# Patient Record
Sex: MALE | Race: WHITE | NOT HISPANIC OR LATINO | Employment: FULL TIME | URBAN - NONMETROPOLITAN AREA
[De-identification: names, ages, dates, MRNs, and addresses within clinical notes are randomized per-mention and may not be internally consistent; named-entity substitution may affect disease eponyms.]

---

## 2017-03-09 ENCOUNTER — OFFICE VISIT (OUTPATIENT)
Dept: CARDIOLOGY | Facility: CLINIC | Age: 67
End: 2017-03-09

## 2017-03-09 VITALS
SYSTOLIC BLOOD PRESSURE: 119 MMHG | HEIGHT: 67 IN | HEART RATE: 66 BPM | BODY MASS INDEX: 24.8 KG/M2 | DIASTOLIC BLOOD PRESSURE: 76 MMHG | RESPIRATION RATE: 16 BRPM | WEIGHT: 158 LBS

## 2017-03-09 DIAGNOSIS — I25.10 ARTERIOSCLEROTIC CARDIOVASCULAR DISEASE (ASCVD): Primary | ICD-10-CM

## 2017-03-09 DIAGNOSIS — E78.5 DYSLIPIDEMIA: ICD-10-CM

## 2017-03-09 DIAGNOSIS — I10 ESSENTIAL HYPERTENSION: ICD-10-CM

## 2017-03-09 DIAGNOSIS — I20.9 ANGINA, CLASS II (HCC): ICD-10-CM

## 2017-03-09 PROCEDURE — 93000 ELECTROCARDIOGRAM COMPLETE: CPT | Performed by: INTERNAL MEDICINE

## 2017-03-09 PROCEDURE — 99213 OFFICE O/P EST LOW 20 MIN: CPT | Performed by: INTERNAL MEDICINE

## 2017-03-09 RX ORDER — LANOLIN ALCOHOL/MO/W.PET/CERES
1000 CREAM (GRAM) TOPICAL DAILY
COMMUNITY
End: 2021-07-02

## 2017-03-09 NOTE — PROGRESS NOTES
"Luci Wilson MD  Duy Schwarz  1950 11/07/2016    Patient Active Problem List   Diagnosis   • Angina, class II-III   • Diabetes   • Arteriosclerotic cardiovascular disease (ASCVD), s/p CABG (3V) in 1991  with recent unsuccessful attempt at PCI of severe calcific stenosis of the proximal left circumflex coronary artery.   • Dyslipidemia, pt's PMD monitoring.   • Essential hypertension   • Abnormal stress ECG   • Abnormal stress test       Dear Luci Wilson MD:    Subjective     Duy Schwarz is a 66 y.o. male with the problems as listed above, presents for follow up of ASCVD.    History of Present Illness:  is a 66-year-old  male with history of known ASCVD, status post CABG in 1991 with recent cardiac catheterization by Dr. Wright revealing significant stenosis in the proximal left circumflex coronary artery with a calcified plaque that could not be stented and patient left on medical therapy.  Patient was seen recently for some recurrent chest pains at which time Ranexa 500 mg by mouth twice a day was added.  Patient states his chest pains are much better since he started on Ranexa.  He is requesting to return to being a \"back up\"  at his job position as a  with his company.  He denies any significant dyspnea, PND orthopnea or pedal edema.    Today patient states that he is feeling better overall.  Only experiences occasional, very mild chest pain.  No SOA, dizziness or syncope reported.      Allergies   Allergen Reactions   • Valium [Diazepam]    :      Current Outpatient Prescriptions:   •  aspirin  MG EC tablet, Take 325 mg by mouth daily., Disp: , Rfl:   •  atenolol (TENORMIN) 50 MG tablet, 1.5 tablets daily. (Daily dose of 75 mg). (Patient taking differently: 75 mg. 1.5 tablets daily. (Daily dose of 75 mg).), Disp: 45 tablet, Rfl: 11  •  atorvastatin (LIPITOR) 40 MG tablet, Take 40 mg by mouth daily., Disp: , Rfl:   •  Cholecalciferol 2000 UNITS capsule, " Take 2,000 Units by mouth Daily., Disp: , Rfl:   •  Cinnamon 500 MG tablet, Take 1 tablet by mouth 2 (two) times a day., Disp: , Rfl:   •  clopidogrel (PLAVIX) 75 MG tablet, Take 1 tablet by mouth daily., Disp: 30 tablet, Rfl: 3  •  coenzyme Q10 100 MG capsule, Take 100 mg by mouth daily., Disp: , Rfl:   •  isosorbide mononitrate (IMDUR) 60 MG 24 hr tablet, Take 1 tablet by mouth daily., Disp: 30 tablet, Rfl: 3  •  lisinopril (PRINIVIL,ZESTRIL) 5 MG tablet, Take 1 tablet by mouth daily., Disp: 30 tablet, Rfl: 11  •  MEGARED OMEGA-3 KRILL  MG capsule, Take 1 capsule by mouth 2 (two) times a day., Disp: , Rfl:   •  nitroglycerin (NITROSTAT) 0.4 MG SL tablet, 1 under the tongue as needed for angina, may repeat q5mins for up three doses, Disp: 25 tablet, Rfl: 2  •  omeprazole (PriLOSEC) 20 MG capsule, Take 20 mg by mouth daily., Disp: , Rfl:   •  ranolazine (RANEXA) 1000 MG 12 hr tablet, Take 1 tablet by mouth 2 (Two) Times a Day., Disp: 60 tablet, Rfl: 5  •  sitaGLIPtin-metFORMIN (JANUMET)  MG per tablet, Take 1 tablet by mouth 2 (two) times a day with meals., Disp: , Rfl:   •  vitamin B-12 (CYANOCOBALAMIN) 1000 MCG tablet, Take 1,000 mcg by mouth Daily., Disp: , Rfl:       The following portions of the patient's history were reviewed and updated as appropriate: allergies, current medications, past family history, past medical history, past social history, past surgical history and problem list.    Social History   Substance Use Topics   • Smoking status: Never Smoker   • Smokeless tobacco: Never Used   • Alcohol use No       Review of Systems   Constitution: Negative for chills.   HENT: Negative for nosebleeds and sore throat.    Respiratory: Negative for wheezing.    Gastrointestinal: Negative for hematemesis, hematochezia and melena.   Genitourinary: Negative for dysuria and hematuria.       Objective   Vitals:    03/09/17 1045   BP: 119/76   Pulse: 66   Resp: 16   Weight: 158 lb (71.7 kg)   Height:  "67\" (170.2 cm)     Body mass index is 24.75 kg/(m^2).        Physical Exam   Constitutional: He is oriented to person, place, and time. He appears well-developed and well-nourished.   HENT:   Mouth/Throat: Oropharynx is clear and moist.   Eyes: EOM are normal. Pupils are equal, round, and reactive to light.   Neck: Neck supple. No JVD present. No tracheal deviation present. No thyromegaly present.   Cardiovascular: Normal rate, regular rhythm, S1 normal and S2 normal.  Exam reveals no gallop and no friction rub.    No murmur heard.  Pulmonary/Chest: Effort normal and breath sounds normal.   Abdominal: Soft. Bowel sounds are normal. He exhibits no mass. There is no tenderness.   Musculoskeletal: Normal range of motion. He exhibits no edema.   Lymphadenopathy:     He has no cervical adenopathy.   Neurological: He is alert and oriented to person, place, and time.   Skin: Skin is warm and dry. No rash noted.   Psychiatric: He has a normal mood and affect.       Lab Results   Component Value Date     08/30/2016    K 3.8 08/30/2016     08/30/2016    CO2 34.0 (H) 08/30/2016    BUN 11 08/30/2016    CREATININE 0.70 08/30/2016    GLUCOSE 91 08/30/2016    CALCIUM 8.8 08/30/2016       Lab Results   Component Value Date    WBC 9.14 08/30/2016    HGB 13.3 08/30/2016    HCT 37.0 (L) 08/30/2016     (L) 08/30/2016       Lab Results   Component Value Date    TRIG 111 08/29/2016    HDL 31 (L) 08/29/2016          ECG 12 Lead  Date/Time: 3/9/2017 10:55 AM  Performed by: CRISTINO TOWNSEND  Authorized by: CRISTINO TOWNSEND   Rhythm: sinus rhythm  BPM: 67  Comments: QTc 387            Assessment/Plan :  Diagnoses and all orders for this visit:    Angina, class II-III, clinically better and almost resolved.  Arteriosclerotic cardiovascular disease (ASCVD), s/p CABG (3V) in 1991 with recent unsuccessful attempt at PCI of severe calcific stenosis of the proximal left circumflex coronary artery.  Essential hypertension, " controlled.  Dyslipidemia, pt's PMD monitoring.     Recommendations:    Continue current medications.  If chest pains reoccur, call office.  Return in about 6 months (around 9/9/2017).    As always, I appreciate very much the opportunity to participate in the cardiovascular care of your patients.      With Best Regards,    Sulaiman Thakkar MD, FACC

## 2017-04-14 RX ORDER — RANOLAZINE 1000 MG/1
1000 TABLET, EXTENDED RELEASE ORAL 2 TIMES DAILY
Qty: 180 TABLET | Refills: 5 | Status: SHIPPED | OUTPATIENT
Start: 2017-04-14 | End: 2018-05-20 | Stop reason: SDUPTHER

## 2017-04-14 NOTE — TELEPHONE ENCOUNTER
Pt ws here on 3.9.17 has a follow up on 9.11.17.    ----- Message from Soo Davila sent at 4/14/2017 10:42 AM EDT -----  Regarding: MEDICATION REFILL  Contact: [phone]  PT HAS TO SWITCH TO [facility]. SO THEY NEED THIS SENT IN TO [facility].     RANEXA ER 1000 MG TABLETS

## 2017-09-11 ENCOUNTER — OFFICE VISIT (OUTPATIENT)
Dept: CARDIOLOGY | Facility: CLINIC | Age: 67
End: 2017-09-11

## 2017-09-11 VITALS
WEIGHT: 157.8 LBS | DIASTOLIC BLOOD PRESSURE: 72 MMHG | SYSTOLIC BLOOD PRESSURE: 117 MMHG | HEIGHT: 67 IN | BODY MASS INDEX: 24.77 KG/M2 | HEART RATE: 74 BPM

## 2017-09-11 DIAGNOSIS — I10 ESSENTIAL HYPERTENSION: ICD-10-CM

## 2017-09-11 DIAGNOSIS — I25.10 ARTERIOSCLEROTIC CARDIOVASCULAR DISEASE (ASCVD): Primary | ICD-10-CM

## 2017-09-11 DIAGNOSIS — E78.5 DYSLIPIDEMIA: ICD-10-CM

## 2017-09-11 PROCEDURE — 99213 OFFICE O/P EST LOW 20 MIN: CPT | Performed by: INTERNAL MEDICINE

## 2017-09-11 PROCEDURE — 93000 ELECTROCARDIOGRAM COMPLETE: CPT | Performed by: INTERNAL MEDICINE

## 2017-09-11 RX ORDER — LISINOPRIL 10 MG/1
10 TABLET ORAL DAILY
COMMUNITY
End: 2021-03-01 | Stop reason: SDUPTHER

## 2017-09-11 NOTE — PROGRESS NOTES
Luci Wilson MD  Duy Schwarz  1950 09/11/2017    Patient Active Problem List   Diagnosis   • Angina, class II-III   • Diabetes   • Arteriosclerotic cardiovascular disease (ASCVD), s/p CABG (3V) in 1991  with recent unsuccessful attempt at PCI of severe calcific stenosis of the proximal left circumflex coronary artery.   • Dyslipidemia, pt's PMD monitoring.   • Essential hypertension   • Abnormal stress ECG   • Abnormal stress test       Dear Luci Wilson MD:    Subjective     Duy Schwarz is a 67 y.o. male with the problems as listed above, presents      History of Present Illness:Mr. Webster is a pleasant 67-year-old  male with history of known ASCVD status post CABG in 1991 with cardiac catheterization with Dr. Wright last year revealing significant stenosis in the proximal left circumflex coronary artery with a calcified plaque that could not be stented and hence was left on medical therapy.  Patient has been doing fairly well on medical therapy including Ranexa thousand milligrams by mouth twice a day.  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  The pattern of chest pain and has been stable over the last 6 months to a year.  He denies any significant dyspnea, PND, orthopnea or pedal edema.  He is a  nonsmoker.    Allergies   Allergen Reactions   • Valium [Diazepam]    :      Current Outpatient Prescriptions:   •  aspirin  MG EC tablet, Take 325 mg by mouth daily., Disp: , Rfl:   •  atenolol (TENORMIN) 50 MG tablet, 1.5 tablets daily. (Daily dose of 75 mg). (Patient taking differently: 75 mg. 1.5 tablets daily. (Daily dose of 75 mg).), Disp: 45 tablet, Rfl: 11  •  atorvastatin (LIPITOR) 40 MG tablet, Take 40 mg by mouth daily., Disp: , Rfl:   •  Cholecalciferol 2000 UNITS capsule, Take 2,000 Units by mouth Daily., Disp: , Rfl:   •  Cinnamon  500 MG tablet, Take 1 tablet by mouth Daily., Disp: , Rfl:   •  clopidogrel (PLAVIX) 75 MG tablet, Take 1 tablet by mouth daily., Disp: 30 tablet, Rfl: 3  •  coenzyme Q10 100 MG capsule, Take 100 mg by mouth daily., Disp: , Rfl:   •  isosorbide mononitrate (IMDUR) 60 MG 24 hr tablet, Take 1 tablet by mouth daily., Disp: 30 tablet, Rfl: 3  •  lisinopril (PRINIVIL,ZESTRIL) 10 MG tablet, Take 10 mg by mouth Daily., Disp: , Rfl:   •  MEGARED OMEGA-3 KRILL  MG capsule, Take 1 capsule by mouth 2 (two) times a day., Disp: , Rfl:   •  nitroglycerin (NITROSTAT) 0.4 MG SL tablet, 1 under the tongue as needed for angina, may repeat q5mins for up three doses, Disp: 25 tablet, Rfl: 2  •  omeprazole (PriLOSEC) 20 MG capsule, Take 20 mg by mouth daily., Disp: , Rfl:   •  ranolazine (RANEXA) 1000 MG 12 hr tablet, Take 1 tablet by mouth 2 (Two) Times a Day., Disp: 180 tablet, Rfl: 5  •  sitaGLIPtin-metFORMIN (JANUMET)  MG per tablet, Take 1 tablet by mouth 2 (two) times a day with meals., Disp: , Rfl:   •  vitamin B-12 (CYANOCOBALAMIN) 1000 MCG tablet, Take 1,000 mcg by mouth Daily., Disp: , Rfl:       The following portions of the patient's history were reviewed and updated as appropriate: allergies, current medications, past family history, past medical history, past social history, past surgical history and problem list.    Social History   Substance Use Topics   • Smoking status: Never Smoker   • Smokeless tobacco: Never Used   • Alcohol use No       Review of Systems   Constitution: Negative for chills and fever.   HENT: Negative for headaches, nosebleeds and sore throat.    Cardiovascular: Positive for palpitations (Exert. ). Negative for chest pain, leg swelling and syncope.   Respiratory: Negative for cough, hemoptysis, shortness of breath and wheezing.    Gastrointestinal: Negative for abdominal pain, hematemesis, hematochezia, melena, nausea and vomiting.   Genitourinary: Negative for dysuria and hematuria.  "      Objective   Vitals:    09/11/17 0844   BP: 117/72   BP Location: Right arm   Patient Position: Sitting   Cuff Size: Adult   Pulse: 74   Weight: 157 lb 12.8 oz (71.6 kg)   Height: 67\" (170.2 cm)     Body mass index is 24.71 kg/(m^2).        Physical Exam   Constitutional: He is oriented to person, place, and time. He appears well-developed and well-nourished.   HENT:   Head: Normocephalic.   Eyes: Conjunctivae and EOM are normal.   Neck: Normal range of motion. Neck supple. No JVD present. No tracheal deviation present. No thyromegaly present.   Cardiovascular: Normal rate, regular rhythm, S1 normal and S2 normal.  Exam reveals no gallop, no S3, no S4 and no friction rub.    No murmur heard.  Pulmonary/Chest: Breath sounds normal. No respiratory distress. He has no wheezes. He has no rales.   Abdominal: Soft. Bowel sounds are normal. He exhibits no mass. There is no tenderness.   Musculoskeletal: He exhibits no edema.   Neurological: He is alert and oriented to person, place, and time. No cranial nerve deficit.   Skin: Skin is warm and dry.   Psychiatric: He has a normal mood and affect.       Lab Results   Component Value Date     08/30/2016    K 3.8 08/30/2016     08/30/2016    CO2 34.0 (H) 08/30/2016    BUN 11 08/30/2016    CREATININE 0.70 08/30/2016    GLUCOSE 91 08/30/2016    CALCIUM 8.8 08/30/2016     No results found for: CKTOTAL  Lab Results   Component Value Date    WBC 9.14 08/30/2016    HGB 13.3 08/30/2016    HCT 37.0 (L) 08/30/2016     (L) 08/30/2016     Fasting Lipid Panel on 8/29/16 revealed:  Total Cholesterol 0 - 200 mg/dL 101   Triglycerides 0 - 150 mg/dL 111   HDL Cholesterol 40 - 60 mg/dL 31 (L)   LDL Cholesterol  0 - 130 mg/dL 50      Echo   Lab Results   Component Value Date    ECHOEFEST 65 06/29/2016       ECG 12 Lead  Date/Time: 9/11/2017 8:38 AM  Performed by: CRISTINO TOWNSEND  Authorized by: CRISTINO TOWNSEND   Rhythm: sinus rhythm  Conduction: conduction normal  ST " Segments: ST segments normal  T Waves: T waves normal              Assessment/Plan    Diagnosis Plan   1. Arteriosclerotic cardiovascular disease (ASCVD), s/p CABG (3V) in 1991  with recent unsuccessful attempt at PCI of severe calcific stenosis of the proximal left circumflex coronary artery.     2. Essential hypertension     3. Dyslipidemia, pt's PMD monitoring.            Recommendations:    1. Continue with aspirin, atorvastatin, Plavix, Ranexa and lisinopril as well as atenolol the same doses.  2. Follow-up in about 7 months or sooner if needed.     Return in about 7 months (around 4/11/2018).    As always, I appreciate very much the opportunity to participate in the cardiovascular care of your patients.      With Best Regards,    Sulaiman Thakkar MD, [facility]    Dragon disclaimer:  Much of this encounter note is an electronic transcription/translation of spoken language to printed text. The electronic translation of spoken language may permit erroneous, or at times, nonsensical words or phrases to be inadvertently transcribed; Although I have reviewed the note for such errors, some may still exist.

## 2018-05-07 ENCOUNTER — OFFICE VISIT (OUTPATIENT)
Dept: CARDIOLOGY | Facility: CLINIC | Age: 68
End: 2018-05-07

## 2018-05-07 VITALS
BODY MASS INDEX: 24.96 KG/M2 | HEIGHT: 67 IN | RESPIRATION RATE: 16 BRPM | DIASTOLIC BLOOD PRESSURE: 68 MMHG | WEIGHT: 159 LBS | HEART RATE: 73 BPM | SYSTOLIC BLOOD PRESSURE: 108 MMHG

## 2018-05-07 DIAGNOSIS — I10 ESSENTIAL HYPERTENSION: ICD-10-CM

## 2018-05-07 DIAGNOSIS — I25.10 ARTERIOSCLEROTIC CARDIOVASCULAR DISEASE (ASCVD): Primary | ICD-10-CM

## 2018-05-07 DIAGNOSIS — E78.5 DYSLIPIDEMIA: ICD-10-CM

## 2018-05-07 PROCEDURE — 93000 ELECTROCARDIOGRAM COMPLETE: CPT | Performed by: INTERNAL MEDICINE

## 2018-05-07 PROCEDURE — 99213 OFFICE O/P EST LOW 20 MIN: CPT | Performed by: INTERNAL MEDICINE

## 2018-05-07 RX ORDER — ASPIRIN 81 MG/1
81 TABLET ORAL DAILY
Qty: 100 TABLET | Refills: 2 | COMMUNITY
Start: 2018-05-07

## 2018-05-07 NOTE — PROGRESS NOTES
Luci Wilson MD  Duy Schwarz  1950 05/07/2018    Patient Active Problem List   Diagnosis   • Angina, class II-III   • Diabetes   • Arteriosclerotic cardiovascular disease (ASCVD), s/p CABG (3V) in 1991  with recent unsuccessful attempt at PCI of severe calcific stenosis of the proximal left circumflex coronary artery.   • Dyslipidemia, pt's PMD monitoring.   • Essential hypertension   • Abnormal stress ECG   • Abnormal stress test       Dear Luci Wilson MD:    Subjective     Duy Schwarz is a 68 y.o. male with the problems as listed above, presents    Chief complaint: Follow-up of ASCVD, status post CABG.    History of Present Illness:Mr. Webster is a pleasant 62-year-old  male with history of known ASCVD, status post CABG in 1991, is here for her regular cardiology follow-up.  He says he's been doing well with no chest pains or significant shortness of breath.  He is able to handle his job well.  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  Otherwise he's been doing fairly well.  He denies any PND, orthopnea or pedal edema.    Allergies   Allergen Reactions   • Valium [Diazepam]    :      Current Outpatient Prescriptions:   •  aspirin EC 81 MG EC tablet, Take 1 tablet by mouth Daily., Disp: 100 tablet, Rfl: 2  •  atenolol (TENORMIN) 50 MG tablet, 1.5 tablets daily. (Daily dose of 75 mg). (Patient taking differently: 75 mg. 1.5 tablets daily. (Daily dose of 75 mg).), Disp: 45 tablet, Rfl: 11  •  atorvastatin (LIPITOR) 40 MG tablet, Take 40 mg by mouth daily., Disp: , Rfl:   •  Cholecalciferol 2000 UNITS capsule, Take 2,000 Units by mouth Daily., Disp: , Rfl:   •  Cinnamon 500 MG tablet, Take 1 tablet by mouth Daily., Disp: , Rfl:   •  clopidogrel (PLAVIX) 75 MG tablet, Take 1 tablet by mouth daily., Disp: 30 tablet, Rfl: 3  •  coenzyme Q10 100 MG capsule, Take 100 mg by mouth daily., Disp: , Rfl:   •  isosorbide mononitrate (IMDUR) 60 MG 24 hr tablet, Take  "1 tablet by mouth daily., Disp: 30 tablet, Rfl: 3  •  lisinopril (PRINIVIL,ZESTRIL) 10 MG tablet, Take 10 mg by mouth Daily., Disp: , Rfl:   •  MEGARED OMEGA-3 KRILL  MG capsule, Take 1 capsule by mouth 2 (two) times a day., Disp: , Rfl:   •  nitroglycerin (NITROSTAT) 0.4 MG SL tablet, 1 under the tongue as needed for angina, may repeat q5mins for up three doses, Disp: 25 tablet, Rfl: 2  •  omeprazole (PriLOSEC) 20 MG capsule, Take 20 mg by mouth daily., Disp: , Rfl:   •  ranolazine (RANEXA) 1000 MG 12 hr tablet, Take 1 tablet by mouth 2 (Two) Times a Day., Disp: 180 tablet, Rfl: 5  •  sitaGLIPtin-metFORMIN (JANUMET)  MG per tablet, Take 1 tablet by mouth 2 (two) times a day with meals., Disp: , Rfl:   •  vitamin B-12 (CYANOCOBALAMIN) 1000 MCG tablet, Take 1,000 mcg by mouth Daily., Disp: , Rfl:       The following portions of the patient's history were reviewed and updated as appropriate: allergies, current medications, past family history, past medical history, past social history, past surgical history and problem list.    Social History   Substance Use Topics   • Smoking status: Never Smoker   • Smokeless tobacco: Never Used   • Alcohol use No       Review of Systems   Constitution: Negative for chills and fever.   HENT: Negative for nosebleeds and sore throat.    Cardiovascular: Negative for chest pain, leg swelling and palpitations.   Respiratory: Negative for cough, hemoptysis, shortness of breath and wheezing.    Gastrointestinal: Negative for abdominal pain, hematemesis, hematochezia, melena, nausea and vomiting.   Genitourinary: Negative for dysuria and hematuria.   Neurological: Negative for headaches.       Objective   Vitals:    05/07/18 1515   BP: 108/68   Pulse: 73   Resp: 16   Weight: 72.1 kg (159 lb)   Height: 170.2 cm (67\")     Body mass index is 24.9 kg/m².        Physical Exam   Constitutional: He is oriented to person, place, and time. He appears well-developed and well-nourished. "   HENT:   Mouth/Throat: Oropharynx is clear and moist.   Eyes: EOM are normal. Pupils are equal, round, and reactive to light.   Neck: Neck supple. No JVD present. No tracheal deviation present. No thyromegaly present.   Cardiovascular: Normal rate, regular rhythm, S1 normal and S2 normal.  Exam reveals no gallop and no friction rub.    No murmur heard.  Pulmonary/Chest: Effort normal and breath sounds normal.   Abdominal: Soft. Bowel sounds are normal. He exhibits no mass. There is no tenderness.   Musculoskeletal: Normal range of motion. He exhibits no edema.   Lymphadenopathy:     He has no cervical adenopathy.   Neurological: He is alert and oriented to person, place, and time.   Skin: Skin is warm and dry. No rash noted.   Psychiatric: He has a normal mood and affect.       Lab Results   Component Value Date     08/30/2016    K 3.8 08/30/2016     08/30/2016    CO2 34.0 (H) 08/30/2016    BUN 11 08/30/2016    CREATININE 0.70 08/30/2016    GLUCOSE 91 08/30/2016    CALCIUM 8.8 08/30/2016     No results found for: CKTOTAL  Lab Results   Component Value Date    WBC 9.14 08/30/2016    HGB 13.3 08/30/2016    HCT 37.0 (L) 08/30/2016     (L) 08/30/2016     No results found for: INR  No results found for: MG  Lab Results   Component Value Date    TRIG 111 08/29/2016    HDL 31 (L) 08/29/2016    LDL 50 08/29/2016      No results found for: BNP    During this visit the following were done:  Labs Reviewed [x]    Labs Ordered []    Radiology Reports Reviewed [x]    Radiology Ordered []    PCP Records Reviewed []    Referring Provider Records Reviewed []    ER Records Reviewed [x]    Hospital Records Reviewed [x]    History Obtained From Family []    Radiology Images Reviewed [x]    Other Reviewed [x]    Records Requested []        ECG 12 Lead  Date/Time: 5/7/2018 3:18 PM  Performed by: CRISTINO TOWNSEND  Authorized by: CRISTINO TOWNSEND   Rhythm: sinus rhythm  BPM: 72  ST Segments: ST segments normal  T Waves: T  waves normal  Clinical impression: normal ECG              Assessment/Plan :  1. Arteriosclerotic cardiovascular disease (ASCVD), s/p CABG (3V) in 1991  with recent unsuccessful attempt at PCI of severe calcific stenosis of the proximal left circumflex coronary artery, Clinically stable.     2. Essential hypertension, Well controlled.      3. Dyslipidemia, pt's PMD monitoring.       Recommendations:  1. We'll decrease aspirin to 81 mg daily and continue with the Plavix.    2. Continue with atenolol, atorvastatin and Imdur as well as Ranexa current doses.  3. Follow-up in 6 months.      Return in about 6 months (around 11/7/2018).    As always, I appreciate very much the opportunity to participate in the cardiovascular care of your patients.    With Best Regards,    Sulaiman Thakkar MD, [facility]    Dragon disclaimer:  Much of this encounter note is an electronic transcription/translation of spoken language to printed text. The electronic translation of spoken language may permit erroneous, or at times, nonsensical words or phrases to be inadvertently transcribed; Although I have reviewed the note for such errors, some may still exist.

## 2018-05-21 RX ORDER — RANOLAZINE 1000 MG/1
TABLET, FILM COATED, EXTENDED RELEASE ORAL
Qty: 180 TABLET | Refills: 5 | Status: SHIPPED | OUTPATIENT
Start: 2018-05-21 | End: 2019-12-03 | Stop reason: SDUPTHER

## 2018-11-26 ENCOUNTER — OFFICE VISIT (OUTPATIENT)
Dept: CARDIOLOGY | Facility: CLINIC | Age: 68
End: 2018-11-26

## 2018-11-26 VITALS
BODY MASS INDEX: 25.43 KG/M2 | DIASTOLIC BLOOD PRESSURE: 79 MMHG | WEIGHT: 162 LBS | HEART RATE: 89 BPM | OXYGEN SATURATION: 99 % | HEIGHT: 67 IN | SYSTOLIC BLOOD PRESSURE: 118 MMHG

## 2018-11-26 DIAGNOSIS — E11.8 TYPE 2 DIABETES MELLITUS WITH COMPLICATION, WITHOUT LONG-TERM CURRENT USE OF INSULIN (HCC): ICD-10-CM

## 2018-11-26 DIAGNOSIS — I10 ESSENTIAL HYPERTENSION: ICD-10-CM

## 2018-11-26 DIAGNOSIS — I25.10 ARTERIOSCLEROTIC CARDIOVASCULAR DISEASE (ASCVD): Primary | ICD-10-CM

## 2018-11-26 PROCEDURE — 99213 OFFICE O/P EST LOW 20 MIN: CPT | Performed by: PHYSICIAN ASSISTANT

## 2018-11-26 PROCEDURE — 93000 ELECTROCARDIOGRAM COMPLETE: CPT | Performed by: PHYSICIAN ASSISTANT

## 2018-11-26 NOTE — PROGRESS NOTES
Luci Wilson MD  Duy Schwarz  1950 11/26/2018    Patient Active Problem List   Diagnosis   • Angina, class II-III   • Diabetes (CMS/HCC)   • Arteriosclerotic cardiovascular disease (ASCVD), s/p CABG (3V) in 1991  with recent unsuccessful attempt at PCI of severe calcific stenosis of the proximal left circumflex coronary artery.   • Dyslipidemia, pt's PMD monitoring.   • Essential hypertension   • Abnormal stress ECG   • Abnormal stress test       Dear Luci Wilson MD:    Subjective       History of Present Illness:    Chief Complaint   Patient presents with   • Follow-up     6 mos   • Med Management     call pharmacy        Duy Schwarz is a pleasant 68 y.o. male with a past medical history significant for  known ASCVD, status post CABG in 1991, is here for her regular cardiology follow-up.     Patient states he's been doing well.  He denies any worsening shortness of breath from baseline, palpitations, dizziness, or syncope.  He does still complain of chest pains have been occurring chronically however he states that they have been very well since he has been on Ranexa and that the only come on with too much exertion.  He states that this is been like this for a couple years now ever since she's found that he had a completely occluded coronary artery as long as his takes his Ranexa he can still perform his activities of daily living.    Allergies   Allergen Reactions   • Valium [Diazepam]    :      Current Outpatient Medications:   •  aspirin EC 81 MG EC tablet, Take 1 tablet by mouth Daily., Disp: 100 tablet, Rfl: 2  •  atenolol (TENORMIN) 50 MG tablet, 1.5 tablets daily. (Daily dose of 75 mg). (Patient taking differently: 75 mg. 1.5 tablets daily. (Daily dose of 75 mg).), Disp: 45 tablet, Rfl: 11  •  atorvastatin (LIPITOR) 40 MG tablet, Take 40 mg by mouth daily., Disp: , Rfl:   •  Cholecalciferol 2000 UNITS capsule, Take 2,000 Units by mouth Daily., Disp: , Rfl:   •  Cinnamon 500 MG  tablet, Take 1 tablet by mouth Daily., Disp: , Rfl:   •  clopidogrel (PLAVIX) 75 MG tablet, Take 1 tablet by mouth daily., Disp: 30 tablet, Rfl: 3  •  coenzyme Q10 100 MG capsule, Take 100 mg by mouth daily., Disp: , Rfl:   •  isosorbide mononitrate (IMDUR) 60 MG 24 hr tablet, Take 1 tablet by mouth daily., Disp: 30 tablet, Rfl: 3  •  lisinopril (PRINIVIL,ZESTRIL) 10 MG tablet, Take 10 mg by mouth Daily., Disp: , Rfl:   •  MEGARED OMEGA-3 KRILL  MG capsule, Take 1 capsule by mouth 2 (two) times a day., Disp: , Rfl:   •  nitroglycerin (NITROSTAT) 0.4 MG SL tablet, 1 under the tongue as needed for angina, may repeat q5mins for up three doses, Disp: 25 tablet, Rfl: 2  •  omeprazole (PriLOSEC) 20 MG capsule, Take 20 mg by mouth daily., Disp: , Rfl:   •  RANEXA 1000 MG 12 hr tablet, TAKE 1 TABLET TWICE A DAY, Disp: 180 tablet, Rfl: 5  •  sitaGLIPtin-metFORMIN (JANUMET)  MG per tablet, Take 1 tablet by mouth 2 (two) times a day with meals., Disp: , Rfl:   •  vitamin B-12 (CYANOCOBALAMIN) 1000 MCG tablet, Take 1,000 mcg by mouth Daily., Disp: , Rfl:       The following portions of the patient's history were reviewed and updated as appropriate: allergies, current medications, past family history, past medical history, past social history, past surgical history and problem list.    Social History     Tobacco Use   • Smoking status: Never Smoker   • Smokeless tobacco: Never Used   Substance Use Topics   • Alcohol use: No   • Drug use: No       Review of Systems   Constitution: Negative for weakness and malaise/fatigue.   Cardiovascular: Positive for chest pain. Negative for dyspnea on exertion, irregular heartbeat, leg swelling and palpitations.   Respiratory: Negative for cough and shortness of breath.    Hematologic/Lymphatic: Negative for bleeding problem. Does not bruise/bleed easily.   Gastrointestinal: Negative for nausea and vomiting.       Objective   Vitals:    11/26/18 1505   BP: 118/79   BP Location:  "Right arm   Patient Position: Sitting   Cuff Size: Adult   Pulse: 89   SpO2: 99%   Weight: 73.5 kg (162 lb)   Height: 170.2 cm (67.01\")     Body mass index is 25.37 kg/m².        Physical Exam   Constitutional: He is oriented to person, place, and time. He appears well-developed and well-nourished. No distress.   HENT:   Head: Normocephalic and atraumatic.   Cardiovascular: Normal rate, regular rhythm, normal heart sounds and intact distal pulses.   Pulmonary/Chest: Effort normal and breath sounds normal. No respiratory distress.   Musculoskeletal: He exhibits no edema.   Neurological: He is alert and oriented to person, place, and time.   Skin: He is not diaphoretic.       Lab Results   Component Value Date     08/30/2016    K 3.8 08/30/2016     08/30/2016    CO2 34.0 (H) 08/30/2016    BUN 11 08/30/2016    CREATININE 0.70 08/30/2016    GLUCOSE 91 08/30/2016    CALCIUM 8.8 08/30/2016     No results found for: CKTOTAL  Lab Results   Component Value Date    WBC 9.14 08/30/2016    HGB 13.3 08/30/2016    HCT 37.0 (L) 08/30/2016     (L) 08/30/2016     No results found for: INR  No results found for: MG  Lab Results   Component Value Date    TRIG 111 08/29/2016    HDL 31 (L) 08/29/2016    LDL 50 08/29/2016      No results found for: BNP    During this visit the following were done:  Labs Reviewed [x]    Labs Ordered []    Radiology Reports Reviewed [x]    Radiology Ordered []    PCP Records Reviewed []    Referring Provider Records Reviewed []    ER Records Reviewed []    Hospital Records Reviewed []    History Obtained From Family []    Radiology Images Reviewed []    Other Reviewed []    Records Requested []         ECG 12 Lead  Date/Time: 11/26/2018 3:15 PM  Performed by: Jayy Almonte PA-C  Authorized by: Jayy Almonte PA-C   Rhythm: sinus rhythm  Conduction: conduction normal  ST Segments: ST segments normal  T depression: V1  Clinical impression: normal ECG  Comments: QTC " 397              Assessment/Plan    Diagnosis Plan   1. Arteriosclerotic cardiovascular disease (ASCVD), s/p CABG (3V) in 1991  with recent unsuccessful attempt at PCI of severe calcific stenosis of the proximal left circumflex coronary artery.     2. Essential hypertension     3. Type 2 diabetes mellitus with complication, without long-term current use of insulin (CMS/[facility])                  Recommendations:  1. Continue current regimen follow-up in 6 months    Patient's Body mass index is 25.37 kg/m². BMI is above normal parameters. Recommendations include: no follow-up required.       Return in about 6 months (around 5/26/2019).    As always, I appreciate very much the opportunity to participate in the cardiovascular care of your patients.      With Best Regards,    RD Casanova disclaimer:  Much of this encounter note is an electronic transcription/translation of spoken language to printed text. The electronic translation of spoken language may permit erroneous, or at times, nonsensical words or phrases to be inadvertently transcribed; Although I have reviewed the note for such errors, some may still exist.

## 2018-11-27 ENCOUNTER — TELEPHONE (OUTPATIENT)
Dept: CARDIOLOGY | Facility: CLINIC | Age: 68
End: 2018-11-27

## 2018-11-27 NOTE — TELEPHONE ENCOUNTER
----- Message from Jayy Almonte PA-C sent at 11/26/2018  3:32 PM EST -----  Please request most recent lipid panel and CMP from pcp

## 2019-06-03 ENCOUNTER — OFFICE VISIT (OUTPATIENT)
Dept: CARDIOLOGY | Facility: CLINIC | Age: 69
End: 2019-06-03

## 2019-06-03 VITALS
SYSTOLIC BLOOD PRESSURE: 100 MMHG | HEIGHT: 67 IN | HEART RATE: 82 BPM | BODY MASS INDEX: 24.96 KG/M2 | DIASTOLIC BLOOD PRESSURE: 64 MMHG | RESPIRATION RATE: 16 BRPM | WEIGHT: 159 LBS

## 2019-06-03 DIAGNOSIS — I25.10 ARTERIOSCLEROTIC CARDIOVASCULAR DISEASE (ASCVD): Primary | ICD-10-CM

## 2019-06-03 DIAGNOSIS — I10 ESSENTIAL HYPERTENSION: ICD-10-CM

## 2019-06-03 DIAGNOSIS — E78.5 DYSLIPIDEMIA: ICD-10-CM

## 2019-06-03 PROCEDURE — 99213 OFFICE O/P EST LOW 20 MIN: CPT | Performed by: PHYSICIAN ASSISTANT

## 2019-06-03 PROCEDURE — 93000 ELECTROCARDIOGRAM COMPLETE: CPT | Performed by: PHYSICIAN ASSISTANT

## 2019-06-03 RX ORDER — PANTOPRAZOLE SODIUM 20 MG/1
20 TABLET, DELAYED RELEASE ORAL DAILY
COMMUNITY

## 2019-06-03 RX ORDER — FERROUS SULFATE 325(65) MG
325 TABLET ORAL
COMMUNITY
End: 2020-08-24 | Stop reason: ALTCHOICE

## 2019-06-03 NOTE — PROGRESS NOTES
Message   Received: Today   Message Contents   Jayy Almonte, RD  [facility]             Can we request most recent lipid panel from pcp?      Requested

## 2019-06-03 NOTE — PROGRESS NOTES
Luci Wilson MD  Duy Schwarz  1950 06/03/2019    Patient Active Problem List   Diagnosis   • Angina, class II-III   • Diabetes (CMS/HCC)   • Arteriosclerotic cardiovascular disease (ASCVD), s/p CABG (3V) in 1991  with recent unsuccessful attempt at PCI of severe calcific stenosis of the proximal left circumflex coronary artery.   • Dyslipidemia, pt's PMD monitoring.   • Essential hypertension   • Abnormal stress ECG   • Abnormal stress test       Dear Luci Wilson MD:    Subjective     History of Present Illness:    Chief Complaint   Patient presents with   • Follow-up     6 mos   • Chest Pain     mod exertion   • Med Management     list provided       Duy Schwarz is a pleasant 69 y.o. male with a past medical history significant for ASCVD, status post CABG in 1991, is here for her regular cardiology follow-up.     Patient reports that he has been doing well.  He does admit that he still has some chest pains with exertion walking greater than 10 minutes but otherwise is asymptomatic.  He reports since being started on Ranexa he has not had to use sublingual nitroglycerin any.  He denies any chest pains at rest.  He denies any orthopnea, PND, or pedal edema.  He denies any dizziness or syncope        Allergies   Allergen Reactions   • Valium [Diazepam]    :      Current Outpatient Medications:   •  atenolol (TENORMIN) 50 MG tablet, 1.5 tablets daily. (Daily dose of 75 mg). (Patient taking differently: 75 mg. 1.5 tablets daily. (Daily dose of 75 mg).), Disp: 45 tablet, Rfl: 11  •  atorvastatin (LIPITOR) 40 MG tablet, Take 40 mg by mouth daily., Disp: , Rfl:   •  Cholecalciferol 2000 UNITS capsule, Take 2,000 Units by mouth Daily., Disp: , Rfl:   •  Cinnamon 500 MG tablet, Take 1 tablet by mouth Daily., Disp: , Rfl:   •  clopidogrel (PLAVIX) 75 MG tablet, Take 1 tablet by mouth daily., Disp: 30 tablet, Rfl: 3  •  coenzyme Q10 100 MG capsule, Take 100 mg by mouth daily., Disp: , Rfl:   •  ferrous  sulfate 325 (65 FE) MG tablet, Take 325 mg by mouth Daily With Breakfast., Disp: , Rfl:   •  isosorbide mononitrate (IMDUR) 60 MG 24 hr tablet, Take 1 tablet by mouth daily., Disp: 30 tablet, Rfl: 3  •  lisinopril (PRINIVIL,ZESTRIL) 10 MG tablet, Take 10 mg by mouth Daily., Disp: , Rfl:   •  MEGARED OMEGA-3 KRILL  MG capsule, Take 1 capsule by mouth 2 (two) times a day., Disp: , Rfl:   •  nitroglycerin (NITROSTAT) 0.4 MG SL tablet, 1 under the tongue as needed for angina, may repeat q5mins for up three doses, Disp: 25 tablet, Rfl: 2  •  pantoprazole (PROTONIX) 40 MG EC tablet, Take 40 mg by mouth Daily., Disp: , Rfl:   •  RANEXA 1000 MG 12 hr tablet, TAKE 1 TABLET TWICE A DAY, Disp: 180 tablet, Rfl: 5  •  sitaGLIPtin-metFORMIN (JANUMET)  MG per tablet, Take 1 tablet by mouth 2 (two) times a day with meals., Disp: , Rfl:   •  vitamin B-12 (CYANOCOBALAMIN) 1000 MCG tablet, Take 1,000 mcg by mouth Daily., Disp: , Rfl:   •  aspirin EC 81 MG EC tablet, Take 1 tablet by mouth Daily., Disp: 100 tablet, Rfl: 2    The following portions of the patient's history were reviewed and updated as appropriate: allergies, current medications, past family history, past medical history, past social history, past surgical history and problem list.    Social History     Tobacco Use   • Smoking status: Never Smoker   • Smokeless tobacco: Never Used   Substance Use Topics   • Alcohol use: No   • Drug use: No       Review of Systems   Constitution: Negative for weakness and malaise/fatigue.   Cardiovascular: Positive for chest pain. Negative for dyspnea on exertion, irregular heartbeat, leg swelling and palpitations.   Respiratory: Negative for cough and shortness of breath.    Hematologic/Lymphatic: Negative for bleeding problem. Does not bruise/bleed easily.   Gastrointestinal: Negative for nausea and vomiting.       Objective   Vitals:    06/03/19 1221   BP: 100/64   Pulse: 82   Resp: 16   Weight: 72.1 kg (159 lb)   Height:  "170.2 cm (67\")     Body mass index is 24.9 kg/m².    Physical Exam   Constitutional: He is oriented to person, place, and time. He appears well-developed and well-nourished. No distress.   HENT:   Head: Normocephalic and atraumatic.   Cardiovascular: Normal rate, regular rhythm, normal heart sounds and intact distal pulses.   Pulmonary/Chest: Effort normal and breath sounds normal. No respiratory distress.   Musculoskeletal: He exhibits no edema.   Neurological: He is alert and oriented to person, place, and time.   Skin: He is not diaphoretic.       Lab Results   Component Value Date     08/30/2016    K 3.8 08/30/2016     08/30/2016    CO2 34.0 (H) 08/30/2016    BUN 11 08/30/2016    CREATININE 0.70 08/30/2016    GLUCOSE 91 08/30/2016    CALCIUM 8.8 08/30/2016     No results found for: CKTOTAL  Lab Results   Component Value Date    WBC 9.14 08/30/2016    HGB 13.3 08/30/2016    HCT 37.0 (L) 08/30/2016     (L) 08/30/2016     No results found for: INR  No results found for: MG  Lab Results   Component Value Date    TRIG 111 08/29/2016    HDL 31 (L) 08/29/2016    LDL 50 08/29/2016      No results found for: BNP    During this visit the following were done:  Labs Reviewed [x]    Labs Ordered []    Radiology Reports Reviewed [x]    Radiology Ordered []    PCP Records Reviewed []    Referring Provider Records Reviewed []    ER Records Reviewed []    Hospital Records Reviewed []    History Obtained From Family []    Radiology Images Reviewed []    Other Reviewed []    Records Requested []         ECG 12 Lead  Date/Time: 6/3/2019 12:25 PM  Performed by: Jayy Almonte PA-C  Authorized by: Jayy Almonte PA-C   Comparison: compared with previous ECG   Similar to previous ECG  Rhythm: sinus rhythm  ST Segments: ST segments normal  T inversion: aVL and V1    Clinical impression: non-specific ECG            Assessment/Plan    Diagnosis Plan   1. Arteriosclerotic cardiovascular disease (ASCVD), s/p CABG " (3V) in 1991  with recent unsuccessful attempt at PCI of severe calcific stenosis of the proximal left circumflex coronary artery.     2. Essential hypertension     3. Dyslipidemia, pt's PMD monitoring.              Recommendations:  1. Patient appears to be doing stable from cardiac standpoint.  He does have some chronic chest pains with moderate exertion reporting chest pains after walking greater than 10 minutes at one time.  He does have known ASCVD and is currently treated medically with Ranexa 1000 mg, lisinopril, Imdur, clopidogrel, aspirin, atorvastatin, atenolol.  I will continue this and will follow-up in 5 to 6 months.  I will also request most recent lipid panel from PCP.    Patient's Body mass index is 24.9 kg/m². BMI is within normal parameters. No follow-up required..       Return in about 6 months (around 12/3/2019).    As always, I appreciate very much the opportunity to participate in the cardiovascular care of your patients.      With Best Regards,    Jayy Almonte PA-C

## 2019-09-03 ENCOUNTER — OFFICE VISIT (OUTPATIENT)
Dept: UROLOGY | Facility: CLINIC | Age: 69
End: 2019-09-03

## 2019-09-03 VITALS — BODY MASS INDEX: 25.74 KG/M2 | WEIGHT: 164 LBS | HEIGHT: 67 IN

## 2019-09-03 DIAGNOSIS — R31.29 MICROSCOPIC HEMATURIA: Primary | ICD-10-CM

## 2019-09-03 PROCEDURE — 99203 OFFICE O/P NEW LOW 30 MIN: CPT | Performed by: UROLOGY

## 2019-09-03 NOTE — PROGRESS NOTES
Chief Complaint:          Chief Complaint   Patient presents with   • Positive Fish Test     New Patient        HPI:   69 y.o. male referred with a positive FISH test he has no tobacco, hypertension and diabetes, he has a decreased force of stream gets up 1-2 times at night he has had a prior Baker's cyst.  He brought a whole load of information.  His urinalysis was negative.  He has 0-2 epithelial cells.  His PSA was 0.5.  His free PSA was 34%.  His hepatitis C virus titer was negative.  His CBC was unremarkable.  His glucose was 159.  His A1c was 6.2.  His vitamin D level was 37.3.  His cardiovascular report shows his LDL C at 39.  Total cholesterol 94 with a triglycerides of 118.  I discussed risk factors for blood in the urine including tobacco family history none of which is present.  He has no burning, blood in the urine, any other significant problems.  I am going to initiate an upper and lower tract investigation of the face of a positive FISH test    Past Medical History:        Past Medical History:   Diagnosis Date   • CAD (coronary artery disease)    • Contraindication to percutaneous coronary intervention (PCI)    • Coronary artery disease    • Diabetes mellitus (CMS/HCC)    • Dizzinesses    • Dyslipidemia    • Hyperlipidemia    • Hypertension          Current Meds:     Current Outpatient Medications   Medication Sig Dispense Refill   • aspirin EC 81 MG EC tablet Take 1 tablet by mouth Daily. 100 tablet 2   • atenolol (TENORMIN) 50 MG tablet 1.5 tablets daily. (Daily dose of 75 mg). (Patient taking differently: 75 mg. 1.5 tablets daily. (Daily dose of 75 mg).) 45 tablet 11   • atorvastatin (LIPITOR) 40 MG tablet Take 40 mg by mouth daily.     • Cholecalciferol 2000 UNITS capsule Take 2,000 Units by mouth Daily.     • Cinnamon 500 MG tablet Take 1 tablet by mouth Daily.     • clopidogrel (PLAVIX) 75 MG tablet Take 1 tablet by mouth daily. 30 tablet 3   • coenzyme Q10 100 MG capsule Take 100 mg by mouth  daily.     • ferrous sulfate 325 (65 FE) MG tablet Take 325 mg by mouth Daily With Breakfast.     • isosorbide mononitrate (IMDUR) 60 MG 24 hr tablet Take 1 tablet by mouth daily. 30 tablet 3   • lisinopril (PRINIVIL,ZESTRIL) 10 MG tablet Take 10 mg by mouth Daily.     • MEGARED OMEGA-3 KRILL  MG capsule Take 1 capsule by mouth 2 (two) times a day.     • nitroglycerin (NITROSTAT) 0.4 MG SL tablet 1 under the tongue as needed for angina, may repeat q5mins for up three doses 25 tablet 2   • pantoprazole (PROTONIX) 40 MG EC tablet Take 40 mg by mouth Daily.     • RANEXA 1000 MG 12 hr tablet TAKE 1 TABLET TWICE A  tablet 5   • sitaGLIPtin-metFORMIN (JANUMET)  MG per tablet Take 1 tablet by mouth 2 (two) times a day with meals.     • vitamin B-12 (CYANOCOBALAMIN) 1000 MCG tablet Take 1,000 mcg by mouth Daily.       No current facility-administered medications for this visit.         Allergies:      Allergies   Allergen Reactions   • Valium [Diazepam]         Past Surgical History:     Past Surgical History:   Procedure Laterality Date   • CARDIAC CATHETERIZATION     • CARDIAC CATHETERIZATION N/A 8/29/2016    Procedure: Left Heart Cath;  Surgeon: Lavon Wright MD;  Location: [facility] INVASIVE LOCATION;  Service:    • CHOLECYSTECTOMY     • CORONARY ANGIOPLASTY     • CORONARY ARTERY BYPASS GRAFT     • CORONARY STENT PLACEMENT     • CYST REMOVAL           Social History:     Social History     Socioeconomic History   • Marital status:      Spouse name: Not on file   • Number of children: Not on file   • Years of education: Not on file   • Highest education level: Not on file   Tobacco Use   • Smoking status: Never Smoker   • Smokeless tobacco: Never Used   Substance and Sexual Activity   • Alcohol use: No   • Drug use: No   • Sexual activity: Defer       Family History:     Family History   Problem Relation Age of Onset   • No Known Problems Mother    • No Known Problems Father        Review  of Systems:     Review of Systems   Constitutional: Negative.    HENT: Negative.    Eyes: Negative.    Respiratory: Negative.    Cardiovascular: Negative.    Gastrointestinal: Negative.    Endocrine: Negative.    Musculoskeletal: Negative.    Allergic/Immunologic: Negative.    Neurological: Negative.    Hematological: Negative.    Psychiatric/Behavioral: Negative.        Physical Exam:     Physical Exam   Constitutional: He is oriented to person, place, and time. He appears well-developed and well-nourished.   HENT:   Head: Normocephalic and atraumatic.   Eyes: Conjunctivae and EOM are normal. Pupils are equal, round, and reactive to light.   Neck: Normal range of motion.   Cardiovascular: Normal rate, regular rhythm, normal heart sounds and intact distal pulses.   Pulmonary/Chest: Effort normal and breath sounds normal.   Abdominal: Soft. Bowel sounds are normal.   Genitourinary:   Genitourinary Comments: Soft nontender abdomen with no organomegaly, rigidity, or tenderness.  He has normal external genitalia and uncircumcised phallus with a freely movable foreskin bilaterally descended testes without masses there is no inguinal hernias adenopathy or abnormalities he had good rectal tone and a large smooth firm prostate.  There is no nodularity or any suspicious rectal abnormalities     Musculoskeletal: Normal range of motion.   Neurological: He is alert and oriented to person, place, and time. He has normal reflexes.   Skin: Skin is warm and dry.   Psychiatric: He has a normal mood and affect. His behavior is normal. Judgment and thought content normal.   Nursing note and vitals reviewed.      I have reviewed the following portions of the patient's history: allergies, current medications, past family history, past medical history, past social history, past surgical history, problem list and ROS and confirm it's accurate.      Procedure:       Assessment/Plan:   Positive fish test-initiate upper and lower tract  investigation      .      Patient's Body mass index is 25.69 kg/m². BMI is above normal parameters. Recommendations include: educational material.              This document has been electronically signed by ELIO GARNICA MD September 3, 2019 2:15 PM

## 2019-09-04 PROBLEM — R31.29 MICROSCOPIC HEMATURIA: Status: ACTIVE | Noted: 2019-09-04

## 2019-09-23 ENCOUNTER — HOSPITAL ENCOUNTER (OUTPATIENT)
Dept: CT IMAGING | Facility: HOSPITAL | Age: 69
Discharge: HOME | End: 2019-09-23
Admitting: UROLOGY

## 2019-09-23 DIAGNOSIS — R31.29 MICROSCOPIC HEMATURIA: ICD-10-CM

## 2019-09-23 LAB — CREAT BLDA-MCNC: 1 MG/DL (ref 0.6–1.3)

## 2019-09-23 PROCEDURE — 74178 CT ABD&PLV WO CNTR FLWD CNTR: CPT | Performed by: RADIOLOGY

## 2019-09-23 PROCEDURE — 82565 ASSAY OF CREATININE: CPT

## 2019-09-23 PROCEDURE — 0 IOVERSOL 68 % SOLUTION: Performed by: UROLOGY

## 2019-09-23 PROCEDURE — 74178 CT ABD&PLV WO CNTR FLWD CNTR: CPT

## 2019-09-23 RX ADMIN — IOVERSOL 100 ML: 678 INJECTION INTRA-ARTERIAL; INTRAVENOUS at 10:30

## 2019-11-12 ENCOUNTER — PROCEDURE VISIT (OUTPATIENT)
Dept: UROLOGY | Facility: CLINIC | Age: 69
End: 2019-11-12

## 2019-11-12 VITALS — WEIGHT: 164 LBS | HEIGHT: 67 IN | BODY MASS INDEX: 25.74 KG/M2

## 2019-11-12 DIAGNOSIS — Z79.2 PROPHYLACTIC ANTIBIOTIC: Primary | ICD-10-CM

## 2019-11-12 DIAGNOSIS — R31.29 MICROSCOPIC HEMATURIA: ICD-10-CM

## 2019-11-12 PROCEDURE — 52000 CYSTOURETHROSCOPY: CPT | Performed by: UROLOGY

## 2019-11-12 PROCEDURE — 96372 THER/PROPH/DIAG INJ SC/IM: CPT | Performed by: UROLOGY

## 2019-11-12 RX ORDER — GENTAMICIN SULFATE 40 MG/ML
80 INJECTION, SOLUTION INTRAMUSCULAR; INTRAVENOUS ONCE
Status: COMPLETED | OUTPATIENT
Start: 2019-11-12 | End: 2019-11-12

## 2019-11-12 RX ADMIN — GENTAMICIN SULFATE 80 MG: 40 INJECTION, SOLUTION INTRAMUSCULAR; INTRAVENOUS at 09:02

## 2019-11-12 NOTE — PROGRESS NOTES
Chief Complaint:          Chief Complaint   Patient presents with   • Cystoscopy       HPI:   69 y.o. male  referred with a positive FISH test he has no tobacco, hypertension and diabetes, he has a decreased force of stream gets up 1-2 times at night he has had a prior Baker's cyst.  He brought a whole load of information.  His urinalysis was negative.  He has 0-2 epithelial cells.  His PSA was 0.5.  His free PSA was 34%.  His hepatitis C virus titer was negative.  His CBC was unremarkable.  His glucose was 159.  His A1c was 6.2.  His vitamin D level was 37.3.  His cardiovascular report shows his LDL C at 39.  Total cholesterol 94 with a triglycerides of 118.  I discussed risk factors for blood in the urine including tobacco family history none of which is present.  He has no burning, blood in the urine, any other significant problems.  I am going to initiate an upper and lower tract investigation of the face of a positive FISH test.  He returns today as upper tract study is negative his lower tract study is negative I am to continue observation I suspect he has some inflammation of the urinary tract as the etiology of his positive fish tests clearly there is no evidence of neoplasia at this time      Past Medical History:        Past Medical History:   Diagnosis Date   • CAD (coronary artery disease)    • Contraindication to percutaneous coronary intervention (PCI)    • Coronary artery disease    • Diabetes mellitus (CMS/HCC)    • Dizzinesses    • Dyslipidemia    • Hyperlipidemia    • Hypertension          Current Meds:     Current Outpatient Medications   Medication Sig Dispense Refill   • aspirin EC 81 MG EC tablet Take 1 tablet by mouth Daily. 100 tablet 2   • atenolol (TENORMIN) 50 MG tablet 1.5 tablets daily. (Daily dose of 75 mg). (Patient taking differently: 75 mg. 1.5 tablets daily. (Daily dose of 75 mg).) 45 tablet 11   • atorvastatin (LIPITOR) 40 MG tablet Take 40 mg by mouth daily.     • Cholecalciferol  2000 UNITS capsule Take 2,000 Units by mouth Daily.     • Cinnamon 500 MG tablet Take 1 tablet by mouth Daily.     • clopidogrel (PLAVIX) 75 MG tablet Take 1 tablet by mouth daily. 30 tablet 3   • coenzyme Q10 100 MG capsule Take 100 mg by mouth daily.     • ferrous sulfate 325 (65 FE) MG tablet Take 325 mg by mouth Daily With Breakfast.     • isosorbide mononitrate (IMDUR) 60 MG 24 hr tablet Take 1 tablet by mouth daily. 30 tablet 3   • lisinopril (PRINIVIL,ZESTRIL) 10 MG tablet Take 10 mg by mouth Daily.     • MEGARED OMEGA-3 KRILL  MG capsule Take 1 capsule by mouth 2 (two) times a day.     • nitroglycerin (NITROSTAT) 0.4 MG SL tablet 1 under the tongue as needed for angina, may repeat q5mins for up three doses 25 tablet 2   • pantoprazole (PROTONIX) 40 MG EC tablet Take 40 mg by mouth Daily.     • RANEXA 1000 MG 12 hr tablet TAKE 1 TABLET TWICE A  tablet 5   • sitaGLIPtin-metFORMIN (JANUMET)  MG per tablet Take 1 tablet by mouth 2 (two) times a day with meals.     • vitamin B-12 (CYANOCOBALAMIN) 1000 MCG tablet Take 1,000 mcg by mouth Daily.       No current facility-administered medications for this visit.         Allergies:      Allergies   Allergen Reactions   • Valium [Diazepam]         Past Surgical History:     Past Surgical History:   Procedure Laterality Date   • CARDIAC CATHETERIZATION     • CARDIAC CATHETERIZATION N/A 8/29/2016    Procedure: Left Heart Cath;  Surgeon: Lavon Wright MD;  Location: [facility] INVASIVE LOCATION;  Service:    • CHOLECYSTECTOMY     • CORONARY ANGIOPLASTY     • CORONARY ARTERY BYPASS GRAFT     • CORONARY STENT PLACEMENT     • CYST REMOVAL           Social History:     Social History     Socioeconomic History   • Marital status:      Spouse name: Not on file   • Number of children: Not on file   • Years of education: Not on file   • Highest education level: Not on file   Tobacco Use   • Smoking status: Never Smoker   • Smokeless tobacco: Never  Used   Substance and Sexual Activity   • Alcohol use: No   • Drug use: No   • Sexual activity: Defer       Family History:     Family History   Problem Relation Age of Onset   • No Known Problems Mother    • No Known Problems Father        Review of Systems:     Review of Systems   Constitutional: Negative.    HENT: Negative.    Eyes: Negative.    Respiratory: Negative.    Cardiovascular: Negative.    Gastrointestinal: Negative.    Endocrine: Negative.    Musculoskeletal: Negative.    Allergic/Immunologic: Negative.    Neurological: Negative.    Hematological: Negative.    Psychiatric/Behavioral: Negative.        Physical Exam:     Physical Exam   Constitutional: He is oriented to person, place, and time. He appears well-developed and well-nourished.   HENT:   Head: Normocephalic and atraumatic.   Eyes: Conjunctivae and EOM are normal. Pupils are equal, round, and reactive to light.   Neck: Normal range of motion.   Cardiovascular: Normal rate, regular rhythm, normal heart sounds and intact distal pulses.   Pulmonary/Chest: Effort normal and breath sounds normal.   Abdominal: Soft. Bowel sounds are normal.   Genitourinary: Penis normal.   Musculoskeletal: Normal range of motion.   Neurological: He is alert and oriented to person, place, and time. He has normal reflexes.   Skin: Skin is warm and dry.   Psychiatric: He has a normal mood and affect. His behavior is normal. Judgment and thought content normal.   Nursing note and vitals reviewed.      I have reviewed the following portions of the patient's history: allergies, current medications, past family history, past medical history, past social history, past surgical history, problem list and ROS and confirm it's accurate.      Procedure:     Cystoscopy:  Patient presents today for cystourethroscopy.  I went ahead and obtained an informed consent including the risk of anesthesia, bleeding, infection, etc.  After prep and drape in a sterile fashion in the low dorsal  lithotomy position the urethra was gently anesthetized with 10 cc of 2% viscous Xylocaine jelly.  After an appropriate period of topical anesthesia I used the Olympus digital 14 [place] flexible cystoscope to examine the anterior urethra which was completely normal, the ureteral orifices were visualized and normal in position and configuration there were no stones, tumors or foreign bodies.  The blue light was enabled and was negative allowing us to see small mucosal lesions. The patient was given 80 mg of gentamicin in an intramuscular fashion  as prophylaxis for the cystoscopy and released from the clinic.    Assessment/Plan:   Positive fish test-negative upper lower tract investigation I will see him back in 6 months  Hematuria-patient was diagnosed with hematuria.  We discussed the significance of microscopic hematuria versus gross hematuria.  We discussed the presence or absence of the type of clotting identified including vermiform clots consistent with ureteral bleeding versus just pink tinged urine versus maged clots.  We discussed the presence of urokinase in the urine which causes the clots to dissolve with time.  We discussed the fact that it takes only a very small amount of blood in the urine to make the urine very red appearing and therefore give one the impression that there is much more blood loss that is really present.  He discussed the use of both an upper and lower tract investigation.  I discussed the fact that an upper tract investigation includes a normal renal ultrasound with a significant risks of missing more subtle lesions.  Progressing to a CT scan without contrast and finally the CT scan with contrast being the gold standard to diagnose the small neoplasms.  We discussed the lower tract investigation consisting of a cystoscopy in many of the cases where the upper tract study is negative.  Also discussed the fact that if there is a contraindication to the use of contrast we would do a  noncontrasted study and this also has a chance of missing small lesions.  The specific instance would be cases of diabetes and chronic renal insufficiency.  Discussed the fact that there is about a 96% chance of a negative workup with episodes of microscopic hematuria and with much greater in the face of gross hematuria.  We discussed the fact that this is a non-cumulative test.  In other words if there is hematuria next year I would recommend continuing to work up the condition because of the fact that neoplasms may be small at the first workup and easily are missed.  I discussed the differential diagnosis of hematuria including trauma, neoplasia, infection, etc.  We discussed the fact that if there is any history of chronic kidney disease or risk factors such as diabetes for contrast a noncontrasted study will be utilized.  We will initiate an investigation.  He had microscopic hematuria of it and significant nature          Patient's Body mass index is 25.68 kg/m². BMI is above normal parameters. Recommendations include: educational material.              This document has been electronically signed by ELIO GARNICA MD November 12, 2019 8:56 AM

## 2019-12-03 ENCOUNTER — OFFICE VISIT (OUTPATIENT)
Dept: CARDIOLOGY | Facility: CLINIC | Age: 69
End: 2019-12-03

## 2019-12-03 VITALS
BODY MASS INDEX: 25.3 KG/M2 | HEART RATE: 76 BPM | DIASTOLIC BLOOD PRESSURE: 72 MMHG | OXYGEN SATURATION: 98 % | WEIGHT: 161.2 LBS | HEIGHT: 67 IN | SYSTOLIC BLOOD PRESSURE: 112 MMHG

## 2019-12-03 DIAGNOSIS — I10 ESSENTIAL HYPERTENSION: ICD-10-CM

## 2019-12-03 DIAGNOSIS — I20.9 ANGINA, CLASS II (HCC): ICD-10-CM

## 2019-12-03 DIAGNOSIS — I25.10 ARTERIOSCLEROTIC CARDIOVASCULAR DISEASE (ASCVD): Primary | ICD-10-CM

## 2019-12-03 PROCEDURE — 93000 ELECTROCARDIOGRAM COMPLETE: CPT | Performed by: PHYSICIAN ASSISTANT

## 2019-12-03 PROCEDURE — 99213 OFFICE O/P EST LOW 20 MIN: CPT | Performed by: PHYSICIAN ASSISTANT

## 2019-12-03 RX ORDER — RANOLAZINE 1000 MG/1
1 TABLET, EXTENDED RELEASE ORAL 2 TIMES DAILY
Qty: 180 TABLET | Refills: 5 | Status: SHIPPED | OUTPATIENT
Start: 2019-12-03 | End: 2021-03-01 | Stop reason: SDUPTHER

## 2019-12-03 NOTE — PROGRESS NOTES
Luci Wilson MD  Duy Schwarz  1950 12/03/2019    Patient Active Problem List   Diagnosis   • Angina, class II-III   • Diabetes (CMS/HCC)   • Arteriosclerotic cardiovascular disease (ASCVD), s/p CABG (3V) in 1991  with recent unsuccessful attempt at PCI of severe calcific stenosis of the proximal left circumflex coronary artery.   • Dyslipidemia, pt's PMD monitoring.   • Essential hypertension   • Abnormal stress ECG   • Abnormal stress test   • Microscopic hematuria       Dear Luci Wilson MD:    Subjective     History of Present Illness:    Chief Complaint   Patient presents with   • Coronary Artery Disease   • Med Management       Duy Schwarz is a pleasant 69 y.o. male with a past medical history significant for ASCVD, status post CABG in 1991, is here for her regular cardiology follow-up.    Patient reports that he has been doing well. He actually reports that his chest pains have not occurred in the last couple months. He also denies any shortness of breath, orthopnea, PND, pedal edema, dizziness, syncope, or near syncope.    Allergies   Allergen Reactions   • Valium [Diazepam] Other (See Comments)     Pt says this causes his heart to skip.   :      Current Outpatient Medications:   •  aspirin EC 81 MG EC tablet, Take 1 tablet by mouth Daily., Disp: 100 tablet, Rfl: 2  •  atenolol (TENORMIN) 50 MG tablet, 1.5 tablets daily. (Daily dose of 75 mg). (Patient taking differently: 75 mg. 1.5 tablets daily. (Daily dose of 75 mg).), Disp: 45 tablet, Rfl: 11  •  atorvastatin (LIPITOR) 40 MG tablet, Take 40 mg by mouth daily., Disp: , Rfl:   •  Cholecalciferol 2000 UNITS capsule, Take 2,000 Units by mouth Daily., Disp: , Rfl:   •  CINNAMON PO, Take 2,000 mg by mouth Daily., Disp: , Rfl:   •  clopidogrel (PLAVIX) 75 MG tablet, Take 1 tablet by mouth daily., Disp: 30 tablet, Rfl: 3  •  COENZYME Q10 PO, Take 20 mg by mouth Daily., Disp: , Rfl:   •  isosorbide mononitrate (IMDUR) 60 MG 24 hr tablet,  "Take 1 tablet by mouth daily., Disp: 30 tablet, Rfl: 3  •  lisinopril (PRINIVIL,ZESTRIL) 10 MG tablet, Take 10 mg by mouth Daily., Disp: , Rfl:   •  MEGARED OMEGA-3 KRILL  MG capsule, Take 1 capsule by mouth 2 (two) times a day., Disp: , Rfl:   •  nitroglycerin (NITROSTAT) 0.4 MG SL tablet, 1 under the tongue as needed for angina, may repeat q5mins for up three doses, Disp: 25 tablet, Rfl: 2  •  pantoprazole (PROTONIX) 40 MG EC tablet, Take 40 mg by mouth Daily., Disp: , Rfl:   •  ranolazine (RANEXA) 1000 MG 12 hr tablet, Take 1 tablet by mouth 2 (Two) Times a Day., Disp: 180 tablet, Rfl: 5  •  sitaGLIPtin-metFORMIN (JANUMET)  MG per tablet, Take 1 tablet by mouth 2 (two) times a day with meals., Disp: , Rfl:   •  vitamin B-12 (CYANOCOBALAMIN) 1000 MCG tablet, Take 1,000 mcg by mouth Daily., Disp: , Rfl:   •  ferrous sulfate 325 (65 FE) MG tablet, Take 325 mg by mouth Daily With Breakfast., Disp: , Rfl:     The following portions of the patient's history were reviewed and updated as appropriate: allergies, current medications, past family history, past medical history, past social history, past surgical history and problem list.    Social History     Tobacco Use   • Smoking status: Never Smoker   • Smokeless tobacco: Never Used   Substance Use Topics   • Alcohol use: No   • Drug use: No       Review of Systems   Constitution: Negative for weakness and malaise/fatigue.   Cardiovascular: Negative for chest pain, dyspnea on exertion and irregular heartbeat.   Respiratory: Negative for cough and shortness of breath.    Hematologic/Lymphatic: Negative for bleeding problem. Does not bruise/bleed easily.   Gastrointestinal: Negative for nausea and vomiting.       Objective   Vitals:    12/03/19 1339   BP: 112/72   BP Location: Right arm   Patient Position: Sitting   Cuff Size: Adult   Pulse: 76   SpO2: 98%   Weight: 73.1 kg (161 lb 3.2 oz)   Height: 170.2 cm (67\")     Body mass index is 25.25 " kg/m².    Physical Exam   Constitutional: He is oriented to person, place, and time. He appears well-developed and well-nourished. No distress.   HENT:   Head: Normocephalic and atraumatic.   Cardiovascular: Normal rate, regular rhythm and normal heart sounds.   Pulmonary/Chest: Effort normal and breath sounds normal. No respiratory distress.   Musculoskeletal: He exhibits no edema.   Neurological: He is alert and oriented to person, place, and time.   Skin: He is not diaphoretic.       Lab Results   Component Value Date     08/30/2016    K 3.8 08/30/2016     08/30/2016    CO2 34.0 (H) 08/30/2016    BUN 11 08/30/2016    CREATININE 1.00 09/23/2019    GLUCOSE 91 08/30/2016    CALCIUM 8.8 08/30/2016     No results found for: CKTOTAL  Lab Results   Component Value Date    WBC 9.14 08/30/2016    HGB 13.3 08/30/2016    HCT 37.0 (L) 08/30/2016     (L) 08/30/2016     No results found for: INR  No results found for: MG  Lab Results   Component Value Date    TRIG 111 08/29/2016    HDL 31 (L) 08/29/2016    LDL 50 08/29/2016      No results found for: BNP    During this visit the following were done:  Labs Reviewed [x]    Labs Ordered []    Radiology Reports Reviewed [x]    Radiology Ordered []    PCP Records Reviewed []    Referring Provider Records Reviewed []    ER Records Reviewed []    Hospital Records Reviewed []    History Obtained From Family []    Radiology Images Reviewed []    Other Reviewed []    Records Requested []         ECG 12 Lead  Date/Time: 12/3/2019 1:39 PM  Performed by: Ofe Russo CMA  Authorized by: Jayy Almonte PA-C   Comparison: compared with previous ECG   Similar to previous ECG  Rhythm: sinus rhythm    Clinical impression: non-specific ECG            Assessment/Plan    Diagnosis Plan   1. Arteriosclerotic cardiovascular disease (ASCVD), s/p CABG (3V) in 1991  with recent unsuccessful attempt at PCI of severe calcific stenosis of the proximal left circumflex  coronary artery.     2. Angina, class II-III     3. Essential hypertension              Recommendations:  1. Patient is stable from cardiac standpoint.  His chest pains seem to resolved his blood pressure is also well controlled.  I will continue Ranexa, lisinopril, Imdur, Plavix, Lipitor, and atenolol.    Patient's Body mass index is 25.25 kg/m². BMI is within normal parameters. No follow-up required..       Return in about 6 months (around 6/3/2020).    As always, I appreciate very much the opportunity to participate in the cardiovascular care of your patients.      With Best Regards,    Jayy Almonte PA-C

## 2020-06-15 ENCOUNTER — OFFICE VISIT (OUTPATIENT)
Dept: UROLOGY | Facility: CLINIC | Age: 70
End: 2020-06-15

## 2020-06-15 VITALS — BODY MASS INDEX: 25.11 KG/M2 | WEIGHT: 160 LBS | HEIGHT: 67 IN | TEMPERATURE: 98.6 F

## 2020-06-15 DIAGNOSIS — N42.9 DISORDER OF PROSTATE: Primary | ICD-10-CM

## 2020-06-15 DIAGNOSIS — R31.29 MICROSCOPIC HEMATURIA: ICD-10-CM

## 2020-06-15 DIAGNOSIS — R35.0 FREQUENCY OF URINATION: ICD-10-CM

## 2020-06-15 LAB
BILIRUB BLD-MCNC: NEGATIVE MG/DL
CLARITY, POC: CLEAR
COLOR UR: YELLOW
GLUCOSE UR STRIP-MCNC: NEGATIVE MG/DL
KETONES UR QL: NEGATIVE
LEUKOCYTE EST, POC: NEGATIVE
NITRITE UR-MCNC: NEGATIVE MG/ML
PH UR: 6.5 [PH] (ref 5–8)
PROT UR STRIP-MCNC: NEGATIVE MG/DL
RBC # UR STRIP: NEGATIVE /UL
SP GR UR: 1.01 (ref 1–1.03)
UROBILINOGEN UR QL: NORMAL

## 2020-06-15 PROCEDURE — 99213 OFFICE O/P EST LOW 20 MIN: CPT | Performed by: UROLOGY

## 2020-06-15 PROCEDURE — 84153 ASSAY OF PSA TOTAL: CPT | Performed by: UROLOGY

## 2020-06-15 PROCEDURE — 81003 URINALYSIS AUTO W/O SCOPE: CPT | Performed by: UROLOGY

## 2020-06-15 PROCEDURE — 36415 COLL VENOUS BLD VENIPUNCTURE: CPT | Performed by: UROLOGY

## 2020-06-15 NOTE — PROGRESS NOTES
Chief Complaint:          Chief Complaint   Patient presents with   • Blood in Urine     6 month fu        HPI:   70 y.o. male referred with a positive FISH test he has no tobacco, hypertension and diabetes, he has a decreased force of stream gets up 1-2 times at night he has had a prior Baker's cyst.  He brought a whole load of information.  His urinalysis was negative.  He has 0-2 epithelial cells.  His PSA was 0.5.  His free PSA was 34%.  His hepatitis C virus titer was negative.  His CBC was unremarkable.  His glucose was 159.  His A1c was 6.2.  His vitamin D level was 37.3.  His cardiovascular report shows his LDL C at 39.  Total cholesterol 94 with a triglycerides of 118.  I discussed risk factors for blood in the urine including tobacco family history none of which is present.  He has no burning, blood in the urine, any other significant problems.  I am going to initiate an upper and lower tract investigation of the face of a positive FISH test.  He returns today as upper tract study is negative his lower tract study is negative I am to continue observation I suspect he has some inflammation of the urinary tract as the etiology of his positive fish tests clearly there is no evidence of neoplasia at this time  Returns today doing great no more bleeding urine is negative.  No dysuria he had 1 spot of blood in his shorts but he sent a lower tract investigation previously.  I would continue close observation because of the positive FISH test but I believe it was probably artifactual      Past Medical History:        Past Medical History:   Diagnosis Date   • CAD (coronary artery disease)    • Contraindication to percutaneous coronary intervention (PCI)    • Coronary artery disease    • Diabetes mellitus (CMS/HCC)    • Dizzinesses    • Dyslipidemia    • Hyperlipidemia    • Hypertension          Current Meds:     Current Outpatient Medications   Medication Sig Dispense Refill   • aspirin EC 81 MG EC tablet Take 1  tablet by mouth Daily. 100 tablet 2   • atenolol (TENORMIN) 50 MG tablet 1.5 tablets daily. (Daily dose of 75 mg). (Patient taking differently: 75 mg. 1.5 tablets daily. (Daily dose of 75 mg).) 45 tablet 11   • atorvastatin (LIPITOR) 40 MG tablet Take 40 mg by mouth daily.     • Cholecalciferol 2000 UNITS capsule Take 2,000 Units by mouth Daily.     • CINNAMON PO Take 2,000 mg by mouth Daily.     • clopidogrel (PLAVIX) 75 MG tablet Take 1 tablet by mouth daily. 30 tablet 3   • COENZYME Q10 PO Take 20 mg by mouth Daily.     • ferrous sulfate 325 (65 FE) MG tablet Take 325 mg by mouth Daily With Breakfast.     • isosorbide mononitrate (IMDUR) 60 MG 24 hr tablet Take 1 tablet by mouth daily. 30 tablet 3   • lisinopril (PRINIVIL,ZESTRIL) 10 MG tablet Take 10 mg by mouth Daily.     • MEGARED OMEGA-3 KRILL  MG capsule Take 1 capsule by mouth 2 (two) times a day.     • nitroglycerin (NITROSTAT) 0.4 MG SL tablet 1 under the tongue as needed for angina, may repeat q5mins for up three doses 25 tablet 2   • pantoprazole (PROTONIX) 40 MG EC tablet Take 40 mg by mouth Daily.     • ranolazine (RANEXA) 1000 MG 12 hr tablet Take 1 tablet by mouth 2 (Two) Times a Day. 180 tablet 5   • sitaGLIPtin-metFORMIN (JANUMET)  MG per tablet Take 1 tablet by mouth 2 (two) times a day with meals.     • vitamin B-12 (CYANOCOBALAMIN) 1000 MCG tablet Take 1,000 mcg by mouth Daily.       No current facility-administered medications for this visit.         Allergies:      Allergies   Allergen Reactions   • Valium [Diazepam] Other (See Comments)     Pt says this causes his heart to skip.        Past Surgical History:     Past Surgical History:   Procedure Laterality Date   • CARDIAC CATHETERIZATION     • CARDIAC CATHETERIZATION N/A 8/29/2016    Procedure: Left Heart Cath;  Surgeon: Lavon Wright MD;  Location: [facility] CATH INVASIVE LOCATION;  Service:    • CHOLECYSTECTOMY     • CORONARY ANGIOPLASTY     • CORONARY ARTERY BYPASS GRAFT      • CORONARY STENT PLACEMENT     • CYST REMOVAL           Social History:     Social History     Socioeconomic History   • Marital status:      Spouse name: Not on file   • Number of children: Not on file   • Years of education: Not on file   • Highest education level: Not on file   Tobacco Use   • Smoking status: Never Smoker   • Smokeless tobacco: Never Used   Substance and Sexual Activity   • Alcohol use: No   • Drug use: No   • Sexual activity: Defer       Family History:     Family History   Problem Relation Age of Onset   • No Known Problems Mother    • No Known Problems Father        Review of Systems:     Review of Systems   Constitutional: Negative.    HENT: Negative.    Eyes: Negative.    Respiratory: Negative.    Cardiovascular: Negative.    Gastrointestinal: Negative.    Endocrine: Negative.    Musculoskeletal: Negative.    Allergic/Immunologic: Negative.    Neurological: Negative.    Hematological: Negative.    Psychiatric/Behavioral: Negative.        Physical Exam:     Physical Exam   Constitutional: He is oriented to person, place, and time. He appears well-developed and well-nourished.   HENT:   Head: Normocephalic and atraumatic.   Eyes: Pupils are equal, round, and reactive to light. Conjunctivae and EOM are normal.   Neck: Normal range of motion.   Cardiovascular: Normal rate, regular rhythm, normal heart sounds and intact distal pulses.   Pulmonary/Chest: Effort normal and breath sounds normal.   Abdominal: Soft. Bowel sounds are normal.   Musculoskeletal: Normal range of motion.   Neurological: He is alert and oriented to person, place, and time. He has normal reflexes.   Skin: Skin is warm and dry.   Psychiatric: He has a normal mood and affect. His behavior is normal. Judgment and thought content normal.   Nursing note and vitals reviewed.      I have reviewed the following portions of the patient's history: allergies, current medications, past family history, past medical history, past  social history, past surgical history, problem list and ROS and confirm it's accurate.      Procedure:       Assessment/Plan:   Hematuria-patient was diagnosed with hematuria.  We discussed the significance of microscopic hematuria versus gross hematuria.  We discussed the presence or absence of the type of clotting identified including vermiform clots consistent with ureteral bleeding versus just pink tinged urine versus maged clots.  We discussed the presence of urokinase in the urine which causes the clots to dissolve with time.  We discussed the fact that it takes only a very small amount of blood in the urine to make the urine very red appearing and therefore give one the impression that there is much more blood loss that is really present.  He discussed the use of both an upper and lower tract investigation.  I discussed the fact that an upper tract investigation includes a normal renal ultrasound with a significant risks of missing more subtle lesions.  Progressing to a CT scan without contrast and finally the CT scan with contrast being the gold standard to diagnose the small neoplasms.  We discussed the lower tract investigation consisting of a cystoscopy in many of the cases where the upper tract study is negative.  Also discussed the fact that if there is a contraindication to the use of contrast we would do a noncontrasted study and this also has a chance of missing small lesions.  The specific instance would be cases of diabetes and chronic renal insufficiency.  Discussed the fact that there is about a 96% chance of a negative workup with episodes of microscopic hematuria and with much greater in the face of gross hematuria.  We discussed the fact that this is a non-cumulative test.  In other words if there is hematuria next year I would recommend continuing to work up the condition because of the fact that neoplasms may be small at the first workup and easily are missed.  I discussed the differential  diagnosis of hematuria including trauma, neoplasia, infection, etc.  We discussed the fact that if there is any history of chronic kidney disease or risk factors such as diabetes for contrast a noncontrasted study will be utilized.  We will initiate an investigation.  He has had a negative upper and lower tract investigation but a positive fish test I suspect is artifactual today's urine is negative but there was 1 spot of blood in his shorts.  I will see him back in 6 months if it persists I would repeat the CT scan            Patient's Body mass index is 25.25 kg/m². BMI is above normal parameters. Recommendations include: educational material.              This document has been electronically signed by ELIO GARNICA MD Lennie 15, 2020 13:32

## 2020-06-16 LAB — PSA SERPL-MCNC: 0.42 NG/ML (ref 0–4)

## 2020-08-24 ENCOUNTER — OFFICE VISIT (OUTPATIENT)
Dept: CARDIOLOGY | Facility: CLINIC | Age: 70
End: 2020-08-24

## 2020-08-24 VITALS
SYSTOLIC BLOOD PRESSURE: 123 MMHG | WEIGHT: 158 LBS | OXYGEN SATURATION: 97 % | DIASTOLIC BLOOD PRESSURE: 79 MMHG | TEMPERATURE: 97.5 F | HEIGHT: 67 IN | BODY MASS INDEX: 24.8 KG/M2 | HEART RATE: 77 BPM

## 2020-08-24 DIAGNOSIS — I10 ESSENTIAL HYPERTENSION: ICD-10-CM

## 2020-08-24 DIAGNOSIS — I25.10 ARTERIOSCLEROTIC CARDIOVASCULAR DISEASE (ASCVD): Primary | ICD-10-CM

## 2020-08-24 PROCEDURE — 99213 OFFICE O/P EST LOW 20 MIN: CPT | Performed by: PHYSICIAN ASSISTANT

## 2020-08-24 PROCEDURE — 93000 ELECTROCARDIOGRAM COMPLETE: CPT | Performed by: PHYSICIAN ASSISTANT

## 2020-08-24 NOTE — PROGRESS NOTES
Luci Wilson MD  Duy Schwarz  1950 08/24/2020    Patient Active Problem List   Diagnosis   • Angina, class II-III   • Diabetes (CMS/HCC)   • Arteriosclerotic cardiovascular disease (ASCVD), s/p CABG (3V) in 1991  with recent unsuccessful attempt at PCI of severe calcific stenosis of the proximal left circumflex coronary artery.   • Dyslipidemia, pt's PMD monitoring.   • Essential hypertension   • Abnormal stress ECG   • Abnormal stress test   • Microscopic hematuria       Dear Luci Wilson MD:    Subjective     History of Present Illness:    Chief Complaint   Patient presents with   • Follow-up     8 mths f/up.    • Med Management     med list.        Duy Schwarz is a pleasant 70 y.o. male with a past medical history significant for ASCVD, status post CABG in 1991, is here for her regular cardiology follow-up.    Overall Mr. Webster reports that he has been doing well from cardiac standpoint.  He reports in last 9 months he only had one single episode of chest pain that he described as a dull ache in the center of his chest only lasted for couple minutes and resolved spontaneously.  He did report this was 2 to 3 weeks ago and has not recurred since.  He denies any shortness of breath, palpitations, dizziness, or syncope.    Allergies   Allergen Reactions   • Valium [Diazepam] Other (See Comments)     Pt says this causes his heart to skip.   :      Current Outpatient Medications:   •  aspirin EC 81 MG EC tablet, Take 1 tablet by mouth Daily., Disp: 100 tablet, Rfl: 2  •  atenolol (TENORMIN) 50 MG tablet, 1.5 tablets daily. (Daily dose of 75 mg). (Patient taking differently: 75 mg. 1.5 tablets daily. (Daily dose of 75 mg).), Disp: 45 tablet, Rfl: 11  •  atorvastatin (LIPITOR) 40 MG tablet, Take 40 mg by mouth daily., Disp: , Rfl:   •  Cholecalciferol 2000 UNITS capsule, Take 2,000 Units by mouth Daily., Disp: , Rfl:   •  CINNAMON PO, Take 2,000 mg by mouth Daily., Disp: , Rfl:   •  clopidogrel  (PLAVIX) 75 MG tablet, Take 1 tablet by mouth daily., Disp: 30 tablet, Rfl: 3  •  COENZYME Q10 PO, Take 20 mg by mouth Daily., Disp: , Rfl:   •  isosorbide mononitrate (IMDUR) 60 MG 24 hr tablet, Take 1 tablet by mouth daily., Disp: 30 tablet, Rfl: 3  •  lisinopril (PRINIVIL,ZESTRIL) 10 MG tablet, Take 10 mg by mouth Daily., Disp: , Rfl:   •  MEGARED OMEGA-3 KRILL  MG capsule, Take 1 capsule by mouth 2 (two) times a day., Disp: , Rfl:   •  nitroglycerin (NITROSTAT) 0.4 MG SL tablet, 1 under the tongue as needed for angina, may repeat q5mins for up three doses, Disp: 25 tablet, Rfl: 2  •  pantoprazole (PROTONIX) 40 MG EC tablet, Take 40 mg by mouth Daily., Disp: , Rfl:   •  ranolazine (RANEXA) 1000 MG 12 hr tablet, Take 1 tablet by mouth 2 (Two) Times a Day., Disp: 180 tablet, Rfl: 5  •  sitaGLIPtin-metFORMIN (JANUMET)  MG per tablet, Take 1 tablet by mouth 2 (two) times a day with meals., Disp: , Rfl:   •  vitamin B-12 (CYANOCOBALAMIN) 1000 MCG tablet, Take 1,000 mcg by mouth Daily., Disp: , Rfl:     The following portions of the patient's history were reviewed and updated as appropriate: allergies, current medications, past family history, past medical history, past social history, past surgical history and problem list.    Social History     Tobacco Use   • Smoking status: Never Smoker   • Smokeless tobacco: Never Used   Substance Use Topics   • Alcohol use: No   • Drug use: No       Review of Systems   Constitution: Negative for malaise/fatigue.   Cardiovascular: Positive for chest pain. Negative for dyspnea on exertion and irregular heartbeat.   Respiratory: Negative for cough and shortness of breath.    Hematologic/Lymphatic: Negative for bleeding problem. Does not bruise/bleed easily.   Gastrointestinal: Negative for nausea and vomiting.   Neurological: Negative for weakness.       Objective   Vitals:    08/24/20 1500   BP: 123/79   BP Location: Right arm   Patient Position: Sitting   Cuff Size:  "Adult   Pulse: 77   Temp: 97.5 °F (36.4 °C)   TempSrc: Infrared   SpO2: 97%   Weight: 71.7 kg (158 lb)   Height: 170.2 cm (67\")     Body mass index is 24.75 kg/m².    Physical Exam   Constitutional: He is oriented to person, place, and time. He appears well-developed and well-nourished. No distress.   HENT:   Head: Normocephalic and atraumatic.   Cardiovascular: Normal rate, regular rhythm and normal heart sounds.   Pulmonary/Chest: Effort normal and breath sounds normal. No respiratory distress.   Musculoskeletal: He exhibits no edema.   Neurological: He is alert and oriented to person, place, and time.   Skin: He is not diaphoretic.       Lab Results   Component Value Date     08/30/2016    K 3.8 08/30/2016     08/30/2016    CO2 34.0 (H) 08/30/2016    BUN 11 08/30/2016    CREATININE 1.00 09/23/2019    GLUCOSE 91 08/30/2016    CALCIUM 8.8 08/30/2016     No results found for: CKTOTAL  Lab Results   Component Value Date    WBC 9.14 08/30/2016    HGB 13.3 08/30/2016    HCT 37.0 (L) 08/30/2016     (L) 08/30/2016     No results found for: INR  No results found for: MG  Lab Results   Component Value Date    PSA 0.416 06/15/2020    TRIG 111 08/29/2016    HDL 31 (L) 08/29/2016    LDL 50 08/29/2016      No results found for: BNP    During this visit the following were done:  Labs Reviewed [x]    Labs Ordered []    Radiology Reports Reviewed [x]    Radiology Ordered []    PCP Records Reviewed []    Referring Provider Records Reviewed []    ER Records Reviewed []    Hospital Records Reviewed []    History Obtained From Family []    Radiology Images Reviewed []    Other Reviewed []    Records Requested []         ECG 12 Lead  Date/Time: 8/24/2020 3:01 PM  Performed by: Jayy Almonte PA-C  Authorized by: Jayy Almonte PA-C   Comparison: compared with previous ECG   Similar to previous ECG  Rhythm: sinus rhythm  ST Segments: ST segments normal    Clinical impression: normal " ECG            Assessment/Plan    Diagnosis Plan   1. Arteriosclerotic cardiovascular disease (ASCVD), s/p CABG (3V) in 1991  with recent unsuccessful attempt at PCI of severe calcific stenosis of the proximal left circumflex coronary artery.     2. Essential hypertension              Recommendations:  1. Overall Mr. Webster appears stable from cardiac standpoint.  He did have a single episode of chest pain that has not recurred I will continue to monitor this for now however if he does have any recurrence asked him to call our office and we can proceed with ischemic work-up with stress test and echocardiogram.  I will continue Ranexa, Imdur, Plavix, atenolol, lisinopril, aspirin, and Lipitor.    Patient's Body mass index is 24.75 kg/m². BMI is within normal parameters. No follow-up required..       Return in about 6 months (around 2/24/2021).    As always, I appreciate very much the opportunity to participate in the cardiovascular care of your patients.      With Best Regards,    Jayy Almonte PA-C

## 2021-01-04 ENCOUNTER — OFFICE VISIT (OUTPATIENT)
Dept: UROLOGY | Facility: CLINIC | Age: 71
End: 2021-01-04

## 2021-01-04 VITALS — BODY MASS INDEX: 25.71 KG/M2 | HEIGHT: 67 IN | WEIGHT: 163.8 LBS | TEMPERATURE: 95.6 F

## 2021-01-04 DIAGNOSIS — R31.29 MICROSCOPIC HEMATURIA: ICD-10-CM

## 2021-01-04 DIAGNOSIS — N42.9 DISORDER OF PROSTATE: Primary | ICD-10-CM

## 2021-01-04 LAB
BILIRUB BLD-MCNC: NEGATIVE MG/DL
CLARITY, POC: CLEAR
COLOR UR: YELLOW
GLUCOSE UR STRIP-MCNC: ABNORMAL MG/DL
KETONES UR QL: NEGATIVE
LEUKOCYTE EST, POC: NEGATIVE
NITRITE UR-MCNC: NEGATIVE MG/ML
PH UR: 5.5 [PH] (ref 5–8)
PROT UR STRIP-MCNC: NEGATIVE MG/DL
RBC # UR STRIP: NEGATIVE /UL
SP GR UR: 1.01 (ref 1–1.03)
UROBILINOGEN UR QL: NORMAL

## 2021-01-04 PROCEDURE — 81003 URINALYSIS AUTO W/O SCOPE: CPT | Performed by: UROLOGY

## 2021-01-04 PROCEDURE — 99213 OFFICE O/P EST LOW 20 MIN: CPT | Performed by: UROLOGY

## 2021-01-04 NOTE — PROGRESS NOTES
Chief Complaint:          Chief Complaint   Patient presents with   • Blood in Urine     6 month fu        HPI:   70 y.o. male  referred with a positive FISH test he has no tobacco, hypertension and diabetes, he has a decreased force of stream gets up 1-2 times at night he has had a prior Baker's cyst.  He brought a whole load of information.  His urinalysis was negative.  He has 0-2 epithelial cells.  His PSA was 0.5.  His free PSA was 34%.  His hepatitis C virus titer was negative.  His CBC was unremarkable.  His glucose was 159.  His A1c was 6.2.  His vitamin D level was 37.3.  His cardiovascular report shows his LDL C at 39.  Total cholesterol 94 with a triglycerides of 118.  I discussed risk factors for blood in the urine including tobacco family history none of which is present.  He has no burning, blood in the urine, any other significant problems.  I am going to initiate an upper and lower tract investigation of the face of a positive FISH test.  He returns today as upper tract study is negative his lower tract study is negative I am to continue observation I suspect he has some inflammation of the urinary tract as the etiology of his positive fish tests clearly there is no evidence of neoplasia at this time  Returns today doing great no more bleeding urine is negative.  No dysuria he had 1 spot of blood in his shorts but he sent a lower tract investigation previously.  I would continue close observation because of the positive FISH test but I believe it was probably artifactual  He returns today.  His PSA was 0.416.  He is not a smoker.  He has no gross hematuria today his urine is negative other than 3+ glucosuria secondary to diabetes.  He has a decreased force of stream.  Exam is unremarkable I will see him back in 6 months he is to call for any additional problems.      Past Medical History:        Past Medical History:   Diagnosis Date   • CAD (coronary artery disease)    • Contraindication to  percutaneous coronary intervention (PCI)    • Coronary artery disease    • Diabetes mellitus (CMS/[facility])    • Dizzinesses    • Dyslipidemia    • Hyperlipidemia    • Hypertension          Current Meds:     Current Outpatient Medications   Medication Sig Dispense Refill   • aspirin EC 81 MG EC tablet Take 1 tablet by mouth Daily. 100 tablet 2   • atenolol (TENORMIN) 50 MG tablet 1.5 tablets daily. (Daily dose of 75 mg). (Patient taking differently: 75 mg. 1.5 tablets daily. (Daily dose of 75 mg).) 45 tablet 11   • atorvastatin (LIPITOR) 40 MG tablet Take 40 mg by mouth daily.     • Cholecalciferol 2000 UNITS capsule Take 2,000 Units by mouth Daily.     • CINNAMON PO Take 2,000 mg by mouth Daily.     • clopidogrel (PLAVIX) 75 MG tablet Take 1 tablet by mouth daily. 30 tablet 3   • COENZYME Q10 PO Take 20 mg by mouth Daily.     • isosorbide mononitrate (IMDUR) 60 MG 24 hr tablet Take 1 tablet by mouth daily. 30 tablet 3   • lisinopril (PRINIVIL,ZESTRIL) 10 MG tablet Take 10 mg by mouth Daily.     • MEGARED OMEGA-3 KRILL  MG capsule Take 1 capsule by mouth 2 (two) times a day.     • nitroglycerin (NITROSTAT) 0.4 MG SL tablet 1 under the tongue as needed for angina, may repeat q5mins for up three doses 25 tablet 2   • pantoprazole (PROTONIX) 40 MG EC tablet Take 40 mg by mouth Daily.     • ranolazine (RANEXA) 1000 MG 12 hr tablet Take 1 tablet by mouth 2 (Two) Times a Day. 180 tablet 5   • sitaGLIPtin-metFORMIN (JANUMET)  MG per tablet Take 1 tablet by mouth 2 (two) times a day with meals.     • vitamin B-12 (CYANOCOBALAMIN) 1000 MCG tablet Take 1,000 mcg by mouth Daily.       No current facility-administered medications for this visit.         Allergies:      Allergies   Allergen Reactions   • Valium [Diazepam] Other (See Comments)     Pt says this causes his heart to skip.        Past Surgical History:     Past Surgical History:   Procedure Laterality Date   • CARDIAC CATHETERIZATION     • CARDIAC  CATHETERIZATION N/A 8/29/2016    Procedure: Left Heart Cath;  Surgeon: Lavon Wright MD;  Location: [facility] CATH INVASIVE LOCATION;  Service:    • CHOLECYSTECTOMY     • CORONARY ANGIOPLASTY     • CORONARY ARTERY BYPASS GRAFT     • CORONARY STENT PLACEMENT     • CYST REMOVAL           Social History:     Social History     Socioeconomic History   • Marital status:      Spouse name: Not on file   • Number of children: Not on file   • Years of education: Not on file   • Highest education level: Not on file   Tobacco Use   • Smoking status: Never Smoker   • Smokeless tobacco: Never Used   Substance and Sexual Activity   • Alcohol use: No   • Drug use: No   • Sexual activity: Defer       Family History:     Family History   Problem Relation Age of Onset   • No Known Problems Mother    • No Known Problems Father        Review of Systems:     Review of Systems   Constitutional: Negative.    HENT: Negative.    Eyes: Negative.    Respiratory: Negative.    Cardiovascular: Negative.    Gastrointestinal: Negative.    Endocrine: Negative.    Musculoskeletal: Negative.    Allergic/Immunologic: Negative.    Neurological: Negative.    Hematological: Negative.    Psychiatric/Behavioral: Negative.        Physical Exam:     Physical Exam  Vitals signs and nursing note reviewed.   Constitutional:       Appearance: He is well-developed.   HENT:      Head: Normocephalic and atraumatic.   Eyes:      Conjunctiva/sclera: Conjunctivae normal.      Pupils: Pupils are equal, round, and reactive to light.   Neck:      Musculoskeletal: Normal range of motion.   Cardiovascular:      Rate and Rhythm: Normal rate and regular rhythm.      Heart sounds: Normal heart sounds.   Pulmonary:      Effort: Pulmonary effort is normal.      Breath sounds: Normal breath sounds.   Abdominal:      General: Bowel sounds are normal.      Palpations: Abdomen is soft.   Musculoskeletal: Normal range of motion.   Skin:     General: Skin is warm and dry.      Neurological:      Mental Status: He is alert and oriented to person, place, and time.      Deep Tendon Reflexes: Reflexes are normal and symmetric.   Psychiatric:         Behavior: Behavior normal.         Thought Content: Thought content normal.         Judgment: Judgment normal.         I have reviewed the following portions of the patient's history: allergies, current medications, past family history, past medical history, past social history, past surgical history, problem list and ROS and confirm it's accurate.      Procedure:       Assessment/Plan:   Hematuria-patient was diagnosed with hematuria.  We discussed the significance of microscopic hematuria versus gross hematuria.  We discussed the presence or absence of the type of clotting identified including vermiform clots consistent with ureteral bleeding versus just pink tinged urine versus maged clots.  We discussed the presence of urokinase in the urine which causes the clots to dissolve with time.  We discussed the fact that it takes only a very small amount of blood in the urine to make the urine very red appearing and therefore give one the impression that there is much more blood loss that is really present.  He discussed the use of both an upper and lower tract investigation.  I discussed the fact that an upper tract investigation includes a normal renal ultrasound with a significant risks of missing more subtle lesions.  Progressing to a CT scan without contrast and finally the CT scan with contrast being the gold standard to diagnose the small neoplasms.  We discussed the lower tract investigation consisting of a cystoscopy in many of the cases where the upper tract study is negative.  Also discussed the fact that if there is a contraindication to the use of contrast we would do a noncontrasted study and this also has a chance of missing small lesions.  The specific instance would be cases of diabetes and chronic renal insufficiency.  Discussed  the fact that there is about a 96% chance of a negative workup with episodes of microscopic hematuria and with much greater in the face of gross hematuria.  We discussed the fact that this is a non-cumulative test.  In other words if there is hematuria next year I would recommend continuing to work up the condition because of the fact that neoplasms may be small at the first workup and easily are missed.  I discussed the differential diagnosis of hematuria including trauma, neoplasia, infection, etc.  We discussed the fact that if there is any history of chronic kidney disease or risk factors such as diabetes for contrast a noncontrasted study will be utilized.  We will initiate an investigation.  He had a fish test most likely artifactual we will continue observation today his urine was negative he has a normal PSA we will continue observation            Patient's Body mass index is 25.65 kg/m². BMI is above normal parameters. Recommendations include: educational material.              This document has been electronically signed by ELIO GARNICA MD January 4, 2021 13:18 EST

## 2021-02-25 DIAGNOSIS — Z23 IMMUNIZATION DUE: ICD-10-CM

## 2021-03-01 ENCOUNTER — OFFICE VISIT (OUTPATIENT)
Dept: CARDIOLOGY | Facility: CLINIC | Age: 71
End: 2021-03-01

## 2021-03-01 VITALS
HEART RATE: 70 BPM | BODY MASS INDEX: 24.99 KG/M2 | DIASTOLIC BLOOD PRESSURE: 80 MMHG | RESPIRATION RATE: 16 BRPM | HEIGHT: 67 IN | TEMPERATURE: 98 F | SYSTOLIC BLOOD PRESSURE: 122 MMHG | WEIGHT: 159.2 LBS

## 2021-03-01 DIAGNOSIS — E78.5 DYSLIPIDEMIA: ICD-10-CM

## 2021-03-01 DIAGNOSIS — I25.10 ARTERIOSCLEROTIC CARDIOVASCULAR DISEASE (ASCVD): Primary | ICD-10-CM

## 2021-03-01 DIAGNOSIS — I10 ESSENTIAL HYPERTENSION: ICD-10-CM

## 2021-03-01 PROCEDURE — 93000 ELECTROCARDIOGRAM COMPLETE: CPT | Performed by: PHYSICIAN ASSISTANT

## 2021-03-01 PROCEDURE — 99214 OFFICE O/P EST MOD 30 MIN: CPT | Performed by: PHYSICIAN ASSISTANT

## 2021-03-01 RX ORDER — RANOLAZINE 1000 MG/1
1 TABLET, EXTENDED RELEASE ORAL 2 TIMES DAILY
Qty: 180 TABLET | Refills: 5 | Status: SHIPPED | OUTPATIENT
Start: 2021-03-01

## 2021-03-01 RX ORDER — REPAGLINIDE 1 MG/1
1 TABLET ORAL
COMMUNITY

## 2021-03-01 RX ORDER — LISINOPRIL 10 MG/1
10 TABLET ORAL DAILY
Qty: 90 TABLET | Refills: 3 | Status: SHIPPED | OUTPATIENT
Start: 2021-03-01

## 2021-03-01 RX ORDER — ISOSORBIDE MONONITRATE 60 MG/1
60 TABLET, EXTENDED RELEASE ORAL DAILY
Qty: 30 TABLET | Refills: 3 | Status: SHIPPED | OUTPATIENT
Start: 2021-03-01 | End: 2021-04-21 | Stop reason: SDUPTHER

## 2021-03-01 RX ORDER — CLOPIDOGREL BISULFATE 75 MG/1
75 TABLET ORAL DAILY
Qty: 90 TABLET | Refills: 3 | Status: SHIPPED | OUTPATIENT
Start: 2021-03-01

## 2021-03-01 RX ORDER — ATORVASTATIN CALCIUM 40 MG/1
40 TABLET, FILM COATED ORAL DAILY
Qty: 90 TABLET | Refills: 3 | Status: SHIPPED | OUTPATIENT
Start: 2021-03-01

## 2021-03-01 RX ORDER — ATENOLOL 50 MG/1
75 TABLET ORAL DAILY
Qty: 135 TABLET | Refills: 2 | Status: SHIPPED | OUTPATIENT
Start: 2021-03-01

## 2021-03-01 NOTE — PROGRESS NOTES
Luci Wilson MD  Duy Schwarz  1950 03/01/2021    Patient Active Problem List   Diagnosis   • Angina, class II-III   • Diabetes (CMS/HCC)   • Arteriosclerotic cardiovascular disease (ASCVD), s/p CABG (3V) in 1991  with recent unsuccessful attempt at PCI of severe calcific stenosis of the proximal left circumflex coronary artery.   • Dyslipidemia, pt's PMD monitoring.   • Essential hypertension   • Abnormal stress ECG   • Abnormal stress test   • Microscopic hematuria       Dear Luci Wilson MD:    Subjective     History of Present Illness:    Chief Complaint   Patient presents with   • Coronary Artery Disease     6 mos follow   • Chest Pain     with exertion   • Med Management     list provided       Duy Schwarz is a pleasant 70 y.o. male with a past medical history significant for r ASCVD, status post CABG in 1991.  He also had left heart catheterization on 8/24/2016 by Dr. Wright where he was found to have a completely occluded LAD, 95% stenosis in the left circumflex proximally, and 70 to 80% narrowing in the distal right posterior descending artery.  He also had angiographically normal free DAMEON to PDA.  He was also found to have a patent left internal thoracic artery.  After the procedure is 99% stenotic left circumflex artery was reduced to 0%.  He does also have history of essential hypertension, dyslipidemia, and diabetes mellitus.  Is here for her regular cardiology follow-up.    Mr. Webster reports overall he is doing well.  He does admit to some chronic chest pains with moderate amounts of physical exertion that have been present for over a decade now and denies any recent worsening of them. He denies any chest pains that wake him up from night recently as well.  He denies any shortness of breath worsening from his baseline, palpitations, dizziness, or syncope.  Blood pressure is well controlled today but he does admit he never checks his blood pressure at home.    Left heart cath on  08/24/2016  SELECTIVE CORONARY ANGIOGRAPHY:   NATIVE VESSELS:   LEFT MAIN CORONARY ARTERY: Normal.   LEFT ANTERIOR DESCENDING CORONARY ARTERY: Occluded.   LEFT CIRCUMFLEX CORONARY ARTERY: There was proximal to midsegment 95+% stenosis with antegrade GENE-2 flow.   RIGHT CORONARY ARTERY: Nonobstructive plaque except for 70% to 80% narrowing in the distal right posterior descending artery.   FREE DAMEON TO PDA: This is an angiographically normal vessel to the small terminal branch of the right PDA.   LEFT INTERNAL THORACIC ARTERY: Patent sequentially to the major diagonal artery in the distal LAD. The LIMA itself is angiographically normal.      PROCEDURE SUMMARY: The patient underwent anticoagulation with unfractionated heparin. The left main coronary artery was engaged coaxially with a 6-Egyptian 3.5 curved Voda catheter. Then 200 mcg of intracoronary nitroglycerin were administered. With some degree of difficulty I could pass a 0.014 Whisper hydrophilic wire into the distal circumflex vessel. Again, with some degree of difficulty, I was able to pass and dilate the proximal to midsegment of the vessel sequentially, with a 1.25 mm, 1.5 mm, and 2.0 mm balloons. Unfortunately, the angiographic appearance in GENE-2 flow of the vessel was unchanged following these balloon inflations.      The Whisper wire was then removed. Attempted placement of a floppy RotaWire was unsuccessful.      I then tried to recross the severely diseased (calcified) proximal circumflex vessel with wires including a Luge, a Whisper, and an Asahi Fielder XT, a  200, and an Asahi Blue both with and without support of a Venture wire control catheter. I as well tried to advance a MicroCross and Corsair catheter into the lesions to support guidewire placement but this was unsuccessful.      Following the procedure, 99% stenosis with GENE-2 flow was reduced to 0% stenosis with GENE-0 flow. The patient had only transient chest pain but left the  cardiac catheterization laboratory pain free, hemodynamically stable with no EKG changes on the monitor lead (or subsequently on a 12-lead EKG in the CVAU).      FINAL IMPRESSIONS:   1. Coronary artery disease, multivessel.   a. Occluded right PDA in the proximal LAD with patent arterial grafts x3.   b. Severe disease in a calcified proximal through midcircumflex vessel with unsuccessful percutaneous transluminal coronary angioplasty this sitting.         Lavon Wright MD, [facility], [facility]          Allergies   Allergen Reactions   • Valium [Diazepam] Other (See Comments)     Pt says this causes his heart to skip.   :      Current Outpatient Medications:   •  aspirin EC 81 MG EC tablet, Take 1 tablet by mouth Daily., Disp: 100 tablet, Rfl: 2  •  atenolol (TENORMIN) 50 MG tablet, 1.5 tablets daily. (Daily dose of 75 mg). (Patient taking differently: 75 mg. 1.5 tablets daily. (Daily dose of 75 mg).), Disp: 45 tablet, Rfl: 11  •  atorvastatin (LIPITOR) 40 MG tablet, Take 40 mg by mouth daily., Disp: , Rfl:   •  Cholecalciferol 2000 UNITS capsule, Take 2,000 Units by mouth Daily., Disp: , Rfl:   •  clopidogrel (PLAVIX) 75 MG tablet, Take 1 tablet by mouth daily., Disp: 30 tablet, Rfl: 3  •  COENZYME Q10 PO, Take 20 mg by mouth Daily., Disp: , Rfl:   •  isosorbide mononitrate (IMDUR) 60 MG 24 hr tablet, Take 1 tablet by mouth daily., Disp: 30 tablet, Rfl: 3  •  lisinopril (PRINIVIL,ZESTRIL) 10 MG tablet, Take 10 mg by mouth Daily., Disp: , Rfl:   •  MEGARED OMEGA-3 KRILL  MG capsule, Take 1 capsule by mouth 2 (two) times a day., Disp: , Rfl:   •  nitroglycerin (NITROSTAT) 0.4 MG SL tablet, 1 under the tongue as needed for angina, may repeat q5mins for up three doses, Disp: 25 tablet, Rfl: 2  •  pantoprazole (PROTONIX) 40 MG EC tablet, Take 40 mg by mouth Daily., Disp: , Rfl:   •  ranolazine (RANEXA) 1000 MG 12 hr tablet, Take 1 tablet by mouth 2 (Two) Times a Day., Disp: 180 tablet, Rfl: 5  •  repaglinide (PRANDIN)  "1 MG tablet, Take 1 mg by mouth 3 (Three) Times a Day Before Meals., Disp: , Rfl:   •  sitaGLIPtin-metFORMIN (JANUMET)  MG per tablet, Take 1 tablet by mouth 2 (two) times a day with meals., Disp: , Rfl:   •  CINNAMON PO, Take 2,000 mg by mouth Daily., Disp: , Rfl:   •  vitamin B-12 (CYANOCOBALAMIN) 1000 MCG tablet, Take 1,000 mcg by mouth Daily., Disp: , Rfl:     The following portions of the patient's history were reviewed and updated as appropriate: allergies, current medications, past family history, past medical history, past social history, past surgical history and problem list.    Social History     Tobacco Use   • Smoking status: Never Smoker   • Smokeless tobacco: Never Used   Substance Use Topics   • Alcohol use: No   • Drug use: No       Review of Systems   Cardiovascular: Positive for chest pain. Negative for leg swelling and palpitations.   Respiratory: Negative for shortness of breath.        Objective   Vitals:    03/01/21 0910   BP: 122/80   Pulse: 70   Resp: 16   Temp: 98 °F (36.7 °C)   Weight: 72.2 kg (159 lb 3.2 oz)   Height: 170.2 cm (67\")     Body mass index is 24.93 kg/m².    Constitutional:       General: Not in acute distress.     Appearance: Healthy appearance. Well-developed and not in distress. Not diaphoretic.   Eyes:      Conjunctiva/sclera: Conjunctivae normal.      Pupils: Pupils are equal, round, and reactive to light.   HENT:      Head: Normocephalic and atraumatic.   Neck:      Vascular: No carotid bruit or JVD.   Pulmonary:      Effort: Pulmonary effort is normal. No respiratory distress.      Breath sounds: Normal breath sounds.   Cardiovascular:      Normal rate. Regular rhythm.   Skin:     General: Skin is cool.   Neurological:      Mental Status: Alert, oriented to person, place, and time and oriented to person, place and time.         Lab Results   Component Value Date     08/30/2016    K 3.8 08/30/2016     08/30/2016    CO2 34.0 (H) 08/30/2016    BUN 11 " 08/30/2016    CREATININE 1.00 09/23/2019    GLUCOSE 91 08/30/2016    CALCIUM 8.8 08/30/2016     No results found for: CKTOTAL  Lab Results   Component Value Date    WBC 9.14 08/30/2016    HGB 13.3 08/30/2016    HCT 37.0 (L) 08/30/2016     (L) 08/30/2016     No results found for: INR  No results found for: MG  Lab Results   Component Value Date    PSA 0.416 06/15/2020    TRIG 111 08/29/2016    HDL 31 (L) 08/29/2016    LDL 50 08/29/2016      No results found for: BNP    During this visit the following were done:  Labs Reviewed [x]    Labs Ordered []    Radiology Reports Reviewed [x]    Radiology Ordered []    PCP Records Reviewed []    Referring Provider Records Reviewed []    ER Records Reviewed []    Hospital Records Reviewed []    History Obtained From Family []    Radiology Images Reviewed []    Other Reviewed []    Records Requested []         ECG 12 Lead    Date/Time: 3/1/2021 9:10 AM  Performed by: Jayy Almonte PA-C  Authorized by: Jayy Almonte PA-C   Comparison: compared with previous ECG   Similar to previous ECG  Rhythm: sinus rhythm  Conduction: conduction normal  ST Segments: ST segments normal    Clinical impression: non-specific ECG            Assessment/Plan    Diagnosis Plan   1. Arteriosclerotic cardiovascular disease (ASCVD), s/p CABG (3V) in 1991  with recent unsuccessful attempt at PCI of severe calcific stenosis of the proximal left circumflex coronary artery.     2. Dyslipidemia, pt's PMD monitoring.     3. Essential hypertension              Recommendations:  1. ASCVD  1. Stable clinical course he does have some chronic stable angina and denies any recent worsening of this.  I did discuss with him that likely will always have chest pains to some extent on physical exertion which he expressed understanding with.  As long as he does not have pain at rest and is still able to perform his ADLs.  2. We will continue aspirin, Lipitor, atenolol, Plavix, lisinopril, Imdur, and  Ranexa.  2. Dyslipidemia  1. We will be requesting recent labs from PCP.    Patient's Body mass index is 24.93 kg/m². BMI is within normal parameters. No follow-up required..       Return in about 6 months (around 9/1/2021).    As always, I appreciate very much the opportunity to participate in the cardiovascular care of your patients.      With Best Regards,    Jayy Almonte PA-C

## 2021-03-02 ENCOUNTER — TELEPHONE (OUTPATIENT)
Dept: CARDIOLOGY | Facility: CLINIC | Age: 71
End: 2021-03-02

## 2021-03-02 NOTE — TELEPHONE ENCOUNTER
Requested labs.     ----- Message from Jayy Almonte PA-C sent at 3/1/2021  9:19 AM EST -----  Can we get recen tblood work from pcp?

## 2021-03-15 ENCOUNTER — IMMUNIZATION (OUTPATIENT)
Dept: VACCINE CLINIC | Facility: HOSPITAL | Age: 71
End: 2021-03-15

## 2021-03-15 PROCEDURE — 91300 HC SARSCOV02 VAC 30MCG/0.3ML IM: CPT | Performed by: INTERNAL MEDICINE

## 2021-03-15 PROCEDURE — 0001A: CPT | Performed by: INTERNAL MEDICINE

## 2021-04-05 ENCOUNTER — IMMUNIZATION (OUTPATIENT)
Dept: VACCINE CLINIC | Facility: HOSPITAL | Age: 71
End: 2021-04-05

## 2021-04-05 PROCEDURE — 91300 HC SARSCOV02 VAC 30MCG/0.3ML IM: CPT | Performed by: INTERNAL MEDICINE

## 2021-04-05 PROCEDURE — 0002A: CPT | Performed by: INTERNAL MEDICINE

## 2021-04-22 RX ORDER — ISOSORBIDE MONONITRATE 60 MG/1
60 TABLET, EXTENDED RELEASE ORAL DAILY
Qty: 90 TABLET | Refills: 1 | Status: SHIPPED | OUTPATIENT
Start: 2021-04-22 | End: 2022-10-11 | Stop reason: SDUPTHER

## 2021-07-02 ENCOUNTER — OFFICE VISIT (OUTPATIENT)
Dept: UROLOGY | Facility: CLINIC | Age: 71
End: 2021-07-02

## 2021-07-02 VITALS — BODY MASS INDEX: 26.06 KG/M2 | HEIGHT: 67 IN | WEIGHT: 166 LBS

## 2021-07-02 DIAGNOSIS — R35.0 FREQUENCY OF URINATION: Primary | ICD-10-CM

## 2021-07-02 DIAGNOSIS — R31.29 MICROSCOPIC HEMATURIA: ICD-10-CM

## 2021-07-02 LAB
BILIRUB BLD-MCNC: ABNORMAL MG/DL
CLARITY, POC: CLEAR
COLOR UR: ABNORMAL
GLUCOSE UR STRIP-MCNC: NEGATIVE MG/DL
KETONES UR QL: NEGATIVE
LEUKOCYTE EST, POC: NEGATIVE
NITRITE UR-MCNC: NEGATIVE MG/ML
PH UR: 5.5 [PH] (ref 5–8)
PROT UR STRIP-MCNC: NEGATIVE MG/DL
RBC # UR STRIP: NEGATIVE /UL
SP GR UR: 1.02 (ref 1–1.03)
UROBILINOGEN UR QL: NORMAL

## 2021-07-02 PROCEDURE — 99213 OFFICE O/P EST LOW 20 MIN: CPT | Performed by: UROLOGY

## 2021-07-02 PROCEDURE — 81003 URINALYSIS AUTO W/O SCOPE: CPT | Performed by: UROLOGY

## 2021-07-02 NOTE — PROGRESS NOTES
Chief Complaint:          Chief Complaint   Patient presents with   • Blood in Urine     6 month f/u       HPI:   71 y.o. male for follow-up.  He works.  His PSA performed on Lennie 15, 2020 was 0.416 his urine is completely negative he has had a completely negative prior work-up.  I do see him back on a as needed basis.  referred with a positive FISH test he has no tobacco, hypertension and diabetes, he has a decreased force of stream gets up 1-2 times at night he has had a prior Baker's cyst.  He brought a whole load of information.  His urinalysis was negative.  He has 0-2 epithelial cells.  His PSA was 0.5.  His free PSA was 34%.  His hepatitis C virus titer was negative.  His CBC was unremarkable.  His glucose was 159.  His A1c was 6.2.  His vitamin D level was 37.3.  His cardiovascular report shows his LDL C at 39.  Total cholesterol 94 with a triglycerides of 118.  I discussed risk factors for blood in the urine including tobacco family history none of which is present.  He has no burning, blood in the urine, any other significant problems.  I am going to initiate an upper and lower tract investigation of the face of a positive FISH test.      Past Medical History:        Past Medical History:   Diagnosis Date   • CAD (coronary artery disease)    • Contraindication to percutaneous coronary intervention (PCI)    • Coronary artery disease    • Diabetes mellitus (CMS/HCC)    • Dizzinesses    • Dyslipidemia    • Hyperlipidemia    • Hypertension          Current Meds:     Current Outpatient Medications   Medication Sig Dispense Refill   • aspirin EC 81 MG EC tablet Take 1 tablet by mouth Daily. 100 tablet 2   • atenolol (TENORMIN) 50 MG tablet Take 1.5 tablets by mouth Daily. 1.5 tablets daily. (Daily dose of 75 mg). 135 tablet 2   • atorvastatin (LIPITOR) 40 MG tablet Take 1 tablet by mouth Daily. 90 tablet 3   • Cholecalciferol 2000 UNITS capsule Take 2,000 Units by mouth Daily.     • CINNAMON PO Take 2,000 mg by  mouth Daily.     • clopidogrel (PLAVIX) 75 MG tablet Take 1 tablet by mouth Daily. 90 tablet 3   • COENZYME Q10 PO Take 20 mg by mouth Daily.     • isosorbide mononitrate (IMDUR) 60 MG 24 hr tablet Take 1 tablet by mouth Daily. 90 tablet 1   • lisinopril (PRINIVIL,ZESTRIL) 10 MG tablet Take 1 tablet by mouth Daily. 90 tablet 3   • MEGARED OMEGA-3 KRILL  MG capsule Take 1 capsule by mouth 2 (two) times a day.     • nitroglycerin (NITROSTAT) 0.4 MG SL tablet 1 under the tongue as needed for angina, may repeat q5mins for up three doses 25 tablet 2   • pantoprazole (PROTONIX) 40 MG EC tablet Take 40 mg by mouth Daily.     • ranolazine (Ranexa) 1000 MG 12 hr tablet Take 1 tablet by mouth 2 (Two) Times a Day. 180 tablet 5   • repaglinide (PRANDIN) 1 MG tablet Take 1 mg by mouth 3 (Three) Times a Day Before Meals.     • sitaGLIPtin-metFORMIN (JANUMET)  MG per tablet Take 1 tablet by mouth 2 (two) times a day with meals.     • vitamin B-12 (CYANOCOBALAMIN) 1000 MCG tablet Take 1,000 mcg by mouth Daily.       No current facility-administered medications for this visit.        Allergies:      Allergies   Allergen Reactions   • Valium [Diazepam] Other (See Comments)     Pt says this causes his heart to skip.        Past Surgical History:     Past Surgical History:   Procedure Laterality Date   • CARDIAC CATHETERIZATION     • CARDIAC CATHETERIZATION N/A 8/29/2016    Procedure: Left Heart Cath;  Surgeon: Lavon Wright MD;  Location: [facility] CATH INVASIVE LOCATION;  Service:    • CHOLECYSTECTOMY     • CORONARY ANGIOPLASTY     • CORONARY ARTERY BYPASS GRAFT     • CORONARY STENT PLACEMENT     • CYST REMOVAL           Social History:     Social History     Socioeconomic History   • Marital status:      Spouse name: Not on file   • Number of children: Not on file   • Years of education: Not on file   • Highest education level: Not on file   Tobacco Use   • Smoking status: Never Smoker   • Smokeless tobacco:  Never Used   Substance and Sexual Activity   • Alcohol use: No   • Drug use: No   • Sexual activity: Defer       Family History:     Family History   Problem Relation Age of Onset   • No Known Problems Mother    • No Known Problems Father        Review of Systems:     Review of Systems   Constitutional: Negative.    HENT: Negative.    Eyes: Negative.    Respiratory: Negative.    Cardiovascular: Negative.    Gastrointestinal: Negative.    Endocrine: Negative.    Musculoskeletal: Negative.    Allergic/Immunologic: Negative.    Neurological: Negative.    Hematological: Negative.    Psychiatric/Behavioral: Negative.        Physical Exam:     Physical Exam  Vitals and nursing note reviewed.   Constitutional:       Appearance: He is well-developed.   HENT:      Head: Normocephalic and atraumatic.   Eyes:      Conjunctiva/sclera: Conjunctivae normal.      Pupils: Pupils are equal, round, and reactive to light.   Cardiovascular:      Rate and Rhythm: Normal rate and regular rhythm.      Heart sounds: Normal heart sounds.   Pulmonary:      Effort: Pulmonary effort is normal.      Breath sounds: Normal breath sounds.   Abdominal:      General: Bowel sounds are normal.      Palpations: Abdomen is soft.   Musculoskeletal:         General: Normal range of motion.      Cervical back: Normal range of motion.   Skin:     General: Skin is warm and dry.   Neurological:      Mental Status: He is alert and oriented to person, place, and time.      Deep Tendon Reflexes: Reflexes are normal and symmetric.   Psychiatric:         Behavior: Behavior normal.         Thought Content: Thought content normal.         Judgment: Judgment normal.         I have reviewed the following portions of the patient's history: allergies, current medications, past family history, past medical history, past social history, past surgical history, problem list and ROS and confirm it's accurate.      Procedure:       Assessment/Plan:   Positive fish test-she  had a screening fish test with no hematuria.  His complete work-up was negative his PSA is normal I do not think he needs to be seen back he has a very low risk                  This document has been electronically signed by ELIO GARNICA MD July 2, 2021 14:06 EDT

## 2021-08-02 ENCOUNTER — OFFICE VISIT (OUTPATIENT)
Dept: CARDIOLOGY | Facility: CLINIC | Age: 71
End: 2021-08-02

## 2021-08-02 VITALS
BODY MASS INDEX: 25.49 KG/M2 | HEART RATE: 69 BPM | TEMPERATURE: 97.1 F | DIASTOLIC BLOOD PRESSURE: 83 MMHG | SYSTOLIC BLOOD PRESSURE: 134 MMHG | WEIGHT: 162.4 LBS | HEIGHT: 67 IN

## 2021-08-02 DIAGNOSIS — I25.10 ARTERIOSCLEROTIC CARDIOVASCULAR DISEASE (ASCVD): Primary | ICD-10-CM

## 2021-08-02 PROCEDURE — 99213 OFFICE O/P EST LOW 20 MIN: CPT | Performed by: PHYSICIAN ASSISTANT

## 2021-08-02 NOTE — PROGRESS NOTES
Luci Wilson MD  Duy Schwarz  1950 08/02/2021    Patient Active Problem List   Diagnosis   • Angina, class II-III   • Diabetes (CMS/HCC)   • Arteriosclerotic cardiovascular disease (ASCVD), s/p CABG (3V) in 1991  with recent unsuccessful attempt at PCI of severe calcific stenosis of the proximal left circumflex coronary artery.   • Dyslipidemia, pt's PMD monitoring.   • Essential hypertension   • Abnormal stress ECG   • Abnormal stress test   • Microscopic hematuria       Dear Luci Wilson MD:    Subjective     History of Present Illness:    Chief Complaint   Patient presents with   • Follow-up     ROUTINE       Duy Schwarz is a pleasant 71 y.o. male with a past medical history significant for ASCVD, status post CABG in 1991.  He also had left heart catheterization on 8/24/2016 by Dr. Wright where he was found to have a completely occluded LAD, 95% stenosis in the left circumflex proximally, and 70 to 80% narrowing in the distal right posterior descending artery.  He also had angiographically normal free DAMEON to PDA.  He was also found to have a patent left internal thoracic artery.  After the procedure is 99% stenotic left circumflex artery was reduced to 0%.  He does also have history of essential hypertension, dyslipidemia, and diabetes mellitus.  Is here for her regular cardiology follow-up.    Mr. Gordillo reports overall he has been doing well since he was last seen he reports he has had no further episodes of chest pains and is performing all his ADLs without any issues and he does still work full-time job at [employer].  He also denies any shortness of breath, palpitations, dizziness, or syncope.    Allergies   Allergen Reactions   • Valium [Diazepam] Other (See Comments)     Pt says this causes his heart to skip.   :      Current Outpatient Medications:   •  aspirin EC 81 MG EC tablet, Take 1 tablet by mouth Daily., Disp: 100 tablet, Rfl: 2  •  atenolol (TENORMIN) 50 MG  tablet, Take 1.5 tablets by mouth Daily. 1.5 tablets daily. (Daily dose of 75 mg)., Disp: 135 tablet, Rfl: 2  •  atorvastatin (LIPITOR) 40 MG tablet, Take 1 tablet by mouth Daily., Disp: 90 tablet, Rfl: 3  •  Cholecalciferol 2000 UNITS capsule, Take 2,000 Units by mouth Daily., Disp: , Rfl:   •  clopidogrel (PLAVIX) 75 MG tablet, Take 1 tablet by mouth Daily., Disp: 90 tablet, Rfl: 3  •  COENZYME Q10 PO, Take 20 mg by mouth Daily., Disp: , Rfl:   •  isosorbide mononitrate (IMDUR) 60 MG 24 hr tablet, Take 1 tablet by mouth Daily., Disp: 90 tablet, Rfl: 1  •  lisinopril (PRINIVIL,ZESTRIL) 10 MG tablet, Take 1 tablet by mouth Daily., Disp: 90 tablet, Rfl: 3  •  MEGARED OMEGA-3 KRILL  MG capsule, Take 1 capsule by mouth 2 (two) times a day., Disp: , Rfl:   •  nitroglycerin (NITROSTAT) 0.4 MG SL tablet, 1 under the tongue as needed for angina, may repeat q5mins for up three doses, Disp: 25 tablet, Rfl: 2  •  pantoprazole (PROTONIX) 20 MG EC tablet, Take 20 mg by mouth Daily., Disp: , Rfl:   •  ranolazine (Ranexa) 1000 MG 12 hr tablet, Take 1 tablet by mouth 2 (Two) Times a Day., Disp: 180 tablet, Rfl: 5  •  repaglinide (PRANDIN) 1 MG tablet, Take 1 mg by mouth 3 (Three) Times a Day Before Meals., Disp: , Rfl:   •  sitaGLIPtin-metFORMIN (JANUMET)  MG per tablet, Take 1 tablet by mouth 2 (two) times a day with meals., Disp: , Rfl:   •  CINNAMON PO, Take 2,000 mg by mouth Daily., Disp: , Rfl:     The following portions of the patient's history were reviewed and updated as appropriate: allergies, current medications, past family history, past medical history, past social history, past surgical history and problem list.    Social History     Tobacco Use   • Smoking status: Never Smoker   • Smokeless tobacco: Never Used   Substance Use Topics   • Alcohol use: No   • Drug use: No         Objective   Vitals:    08/02/21 0917   BP: 134/83   Pulse: 69   Temp: 97.1 °F (36.2 °C)   Weight: 73.7 kg (162 lb 6.4 oz)  "  Height: 170.2 cm (67\")     Body mass index is 25.44 kg/m².    Constitutional:       General: Not in acute distress.     Appearance: Healthy appearance. Well-developed and not in distress. Not diaphoretic.   Eyes:      Conjunctiva/sclera: Conjunctivae normal.      Pupils: Pupils are equal, round, and reactive to light.   HENT:      Head: Normocephalic and atraumatic.   Neck:      Vascular: No carotid bruit or JVD.   Pulmonary:      Effort: Pulmonary effort is normal. No respiratory distress.      Breath sounds: Normal breath sounds.   Cardiovascular:      Normal rate. Regular rhythm.   Skin:     General: Skin is cool.   Neurological:      Mental Status: Alert, oriented to person, place, and time and oriented to person, place and time.         Lab Results   Component Value Date     08/30/2016    K 3.8 08/30/2016     08/30/2016    CO2 34.0 (H) 08/30/2016    BUN 11 08/30/2016    CREATININE 1.00 09/23/2019    GLUCOSE 91 08/30/2016    CALCIUM 8.8 08/30/2016     No results found for: CKTOTAL  Lab Results   Component Value Date    WBC 9.14 08/30/2016    HGB 13.3 08/30/2016    HCT 37.0 (L) 08/30/2016     (L) 08/30/2016     No results found for: INR  No results found for: MG  Lab Results   Component Value Date    PSA 0.416 06/15/2020    TRIG 111 08/29/2016    HDL 31 (L) 08/29/2016    LDL 50 08/29/2016      No results found for: BNP    During this visit the following were done:  Labs Reviewed []    Labs Ordered []    Radiology Reports Reviewed []    Radiology Ordered []    PCP Records Reviewed []    Referring Provider Records Reviewed []    ER Records Reviewed []    Hospital Records Reviewed []    History Obtained From Family []    Radiology Images Reviewed []    Other Reviewed []    Records Requested []       Procedures    Assessment/Plan    Diagnosis Plan   1. Arteriosclerotic cardiovascular disease (ASCVD), s/p CABG (3V) in 1991  with recent unsuccessful attempt at PCI of severe calcific stenosis of the " proximal left circumflex coronary artery.              Recommendations:  1. ASCVD  1. Asymptomatic today we will continue aspirin, atenolol, Lipitor, Plavix, Imdur, lisinopril, and Ranexa.  2. I did request recent blood work from PCP.  2. Dyslipidemia  1. Continue Lipitor requesting lipid panel from PCP.      No follow-ups on file.    As always, I appreciate very much the opportunity to participate in the cardiovascular care of your patients.      With Best Regards,    Jayy Almonte PA-C

## 2021-08-04 ENCOUNTER — TELEPHONE (OUTPATIENT)
Dept: CARDIOLOGY | Facility: CLINIC | Age: 71
End: 2021-08-04

## 2021-08-04 NOTE — TELEPHONE ENCOUNTER
----- Message from Jayy Almonte PA-C sent at 8/2/2021  9:38 AM EDT -----  Can we get recent blood work from pcp?       REQUESTED

## 2021-11-02 ENCOUNTER — TRANSCRIBE ORDERS (OUTPATIENT)
Dept: ADMINISTRATIVE | Facility: HOSPITAL | Age: 71
End: 2021-11-02

## 2021-11-02 DIAGNOSIS — R41.3 MEMORY LOSS: Primary | ICD-10-CM

## 2021-11-29 ENCOUNTER — HOSPITAL ENCOUNTER (OUTPATIENT)
Dept: CT IMAGING | Facility: HOSPITAL | Age: 71
Discharge: HOME | End: 2021-11-29
Admitting: INTERNAL MEDICINE

## 2021-11-29 DIAGNOSIS — R41.3 MEMORY LOSS: ICD-10-CM

## 2021-11-29 PROCEDURE — 70450 CT HEAD/BRAIN W/O DYE: CPT

## 2021-11-29 PROCEDURE — 70450 CT HEAD/BRAIN W/O DYE: CPT | Performed by: RADIOLOGY

## 2022-03-23 ENCOUNTER — OFFICE VISIT (OUTPATIENT)
Dept: CARDIOLOGY | Facility: CLINIC | Age: 72
End: 2022-03-23

## 2022-03-23 VITALS
HEART RATE: 62 BPM | SYSTOLIC BLOOD PRESSURE: 102 MMHG | HEIGHT: 67 IN | BODY MASS INDEX: 25.15 KG/M2 | DIASTOLIC BLOOD PRESSURE: 68 MMHG | TEMPERATURE: 98 F | WEIGHT: 160.2 LBS

## 2022-03-23 DIAGNOSIS — I10 ESSENTIAL HYPERTENSION: ICD-10-CM

## 2022-03-23 DIAGNOSIS — I25.10 ARTERIOSCLEROTIC CARDIOVASCULAR DISEASE (ASCVD): Primary | ICD-10-CM

## 2022-03-23 DIAGNOSIS — E78.5 DYSLIPIDEMIA: ICD-10-CM

## 2022-03-23 PROCEDURE — 93000 ELECTROCARDIOGRAM COMPLETE: CPT | Performed by: PHYSICIAN ASSISTANT

## 2022-03-23 PROCEDURE — 99213 OFFICE O/P EST LOW 20 MIN: CPT | Performed by: PHYSICIAN ASSISTANT

## 2022-03-23 NOTE — PROGRESS NOTES
Luci Wilson MD  Duy Schwarz  1950 03/23/2022    Patient Active Problem List   Diagnosis   • Angina, class II-III   • Diabetes (HCC)   • Arteriosclerotic cardiovascular disease (ASCVD), s/p CABG (3V) in 1991  with recent unsuccessful attempt at PCI of severe calcific stenosis of the proximal left circumflex coronary artery.   • Dyslipidemia, pt's PMD monitoring.   • Essential hypertension   • Abnormal stress ECG   • Abnormal stress test   • Microscopic hematuria       Dear Luci Wilson MD:    Subjective     History of Present Illness:    Chief Complaint   Patient presents with   • Follow-up     Pt is here for routine follow up and has no complaints        Duy Schwarz is a pleasant 71 y.o. male with a past medical history significant for ASCVD, status post CABG in 1991.  He also had left heart catheterization on 8/24/2016 by Dr. Wright where he was found to have a completely occluded LAD, 95% stenosis in the left circumflex proximally, and 70 to 80% narrowing in the distal right posterior descending artery.  He also had angiographically normal free DAMEON to PDA.  He was also found to have a patent left internal thoracic artery.  After the procedure is 99% stenotic left circumflex artery was reduced to 0%.  He does also have history of essential hypertension, dyslipidemia, and diabetes mellitus.  He comes in today for routine cardiology follow-up.    He reports he has been doing excellent since he was last seen.  He does still report that he works regularly without any issues.  He denies any chest pains today, shortness of breath, palpitations, dizziness, or syncope.  He reports he is tolerating aspirin well and denies any bleeding issues with this as well.      Allergies   Allergen Reactions   • Valium [Diazepam] Other (See Comments)     Pt says this causes his heart to skip.   :      Current Outpatient Medications:   •  aspirin EC 81 MG EC tablet, Take 1 tablet by mouth Daily., Disp: 100  tablet, Rfl: 2  •  atenolol (TENORMIN) 50 MG tablet, Take 1.5 tablets by mouth Daily. 1.5 tablets daily. (Daily dose of 75 mg)., Disp: 135 tablet, Rfl: 2  •  atorvastatin (LIPITOR) 40 MG tablet, Take 1 tablet by mouth Daily., Disp: 90 tablet, Rfl: 3  •  clopidogrel (PLAVIX) 75 MG tablet, Take 1 tablet by mouth Daily., Disp: 90 tablet, Rfl: 3  •  COENZYME Q10 PO, Take 20 mg by mouth Daily., Disp: , Rfl:   •  isosorbide mononitrate (IMDUR) 60 MG 24 hr tablet, Take 1 tablet by mouth Daily., Disp: 90 tablet, Rfl: 1  •  lisinopril (PRINIVIL,ZESTRIL) 10 MG tablet, Take 1 tablet by mouth Daily., Disp: 90 tablet, Rfl: 3  •  MEGARED OMEGA-3 KRILL  MG capsule, Take 1 capsule by mouth 2 (two) times a day., Disp: , Rfl:   •  nitroglycerin (NITROSTAT) 0.4 MG SL tablet, 1 under the tongue as needed for angina, may repeat q5mins for up three doses, Disp: 25 tablet, Rfl: 2  •  pantoprazole (PROTONIX) 20 MG EC tablet, Take 20 mg by mouth Daily., Disp: , Rfl:   •  ranolazine (Ranexa) 1000 MG 12 hr tablet, Take 1 tablet by mouth 2 (Two) Times a Day., Disp: 180 tablet, Rfl: 5  •  repaglinide (PRANDIN) 1 MG tablet, Take 1 mg by mouth 3 (Three) Times a Day Before Meals., Disp: , Rfl:   •  sitaGLIPtin-metFORMIN (JANUMET)  MG per tablet, Take 1 tablet by mouth 2 (two) times a day with meals., Disp: , Rfl:   •  vitamin D3 125 MCG (5000 UT) capsule capsule, Take 5,000 Units by mouth Daily., Disp: , Rfl:   •  Cholecalciferol 2000 UNITS capsule, Take 2,000 Units by mouth Daily., Disp: , Rfl:   •  CINNAMON PO, Take 2,000 mg by mouth Daily., Disp: , Rfl:     The following portions of the patient's history were reviewed and updated as appropriate: allergies, current medications, past family history, past medical history, past social history, past surgical history and problem list.    Social History     Tobacco Use   • Smoking status: Never Smoker   • Smokeless tobacco: Never Used   Substance Use Topics   • Alcohol use: No   • Drug  "use: No         Objective   Vitals:    03/23/22 1511   BP: 102/68   Pulse: 62   Temp: 98 °F (36.7 °C)   Weight: 72.7 kg (160 lb 3.2 oz)   Height: 170.2 cm (67.01\")     Body mass index is 25.08 kg/m².    Constitutional:       General: Not in acute distress.     Appearance: Healthy appearance. Well-developed and not in distress. Not diaphoretic.   Eyes:      Conjunctiva/sclera: Conjunctivae normal.      Pupils: Pupils are equal, round, and reactive to light.   HENT:      Head: Normocephalic and atraumatic.   Neck:      Vascular: No carotid bruit or JVD.   Pulmonary:      Effort: Pulmonary effort is normal. No respiratory distress.      Breath sounds: Normal breath sounds.   Cardiovascular:      Normal rate. Regular rhythm.   Skin:     General: Skin is cool.   Neurological:      Mental Status: Alert, oriented to person, place, and time and oriented to person, place and time.         Lab Results   Component Value Date     08/30/2016    K 3.8 08/30/2016     08/30/2016    CO2 34.0 (H) 08/30/2016    BUN 11 08/30/2016    CREATININE 1.00 09/23/2019    GLUCOSE 91 08/30/2016    CALCIUM 8.8 08/30/2016     No results found for: CKTOTAL  Lab Results   Component Value Date    WBC 9.14 08/30/2016    HGB 13.3 08/30/2016    HCT 37.0 (L) 08/30/2016     (L) 08/30/2016     No results found for: INR  No results found for: MG  Lab Results   Component Value Date    PSA 0.416 06/15/2020    TRIG 111 08/29/2016    HDL 31 (L) 08/29/2016    LDL 50 08/29/2016      No results found for: BNP    During this visit the following were done:  Labs Reviewed []    Labs Ordered []    Radiology Reports Reviewed []    Radiology Ordered []    PCP Records Reviewed []    Referring Provider Records Reviewed []    ER Records Reviewed []    Hospital Records Reviewed []    History Obtained From Family []    Radiology Images Reviewed []    Other Reviewed []    Records Requested []         ECG 12 Lead    Date/Time: 3/23/2022 3:17 PM  Performed by: " Jayy Almonte PA-C  Authorized by: Jayy Almonte PA-C   Comparison: compared with previous ECG   Similar to previous ECG  Rhythm: sinus rhythm  Conduction: conduction normal  ST Segments: ST segments normal    Clinical impression: normal ECG            Assessment/Plan    Diagnosis Plan   1. Arteriosclerotic cardiovascular disease (ASCVD), s/p CABG (3V) in 1991  with recent unsuccessful attempt at PCI of severe calcific stenosis of the proximal left circumflex coronary artery.     2. Dyslipidemia, pt's PMD monitoring.     3. Essential hypertension              Recommendations:  1. ASCVD  1. Denies any anginal symptoms completely asymptomatic.  We will continue current therapy which includes aspirin, atenolol, Lipitor, Plavix, Imdur, lisinopril, and Ranexa.      Return in about 6 months (around 9/23/2022).    As always, I appreciate very much the opportunity to participate in the cardiovascular care of your patients.      With Best Regards,    Jayy Almonte PA-C           Dr. Russell

## 2022-03-28 ENCOUNTER — TELEPHONE (OUTPATIENT)
Dept: CARDIOLOGY | Facility: CLINIC | Age: 72
End: 2022-03-28

## 2022-03-28 NOTE — TELEPHONE ENCOUNTER
----- Message from Jayy Almonte PA-C sent at 3/23/2022  3:36 PM EDT -----  Can we get recent labs from pcp?

## 2022-07-27 ENCOUNTER — TRANSCRIBE ORDERS (OUTPATIENT)
Dept: LAB | Facility: HOSPITAL | Age: 72
End: 2022-07-27

## 2022-07-27 ENCOUNTER — LAB (OUTPATIENT)
Dept: LAB | Facility: HOSPITAL | Age: 72
End: 2022-07-27

## 2022-07-27 DIAGNOSIS — Z01.818 OTHER SPECIFIED PRE-OPERATIVE EXAMINATION: ICD-10-CM

## 2022-07-27 DIAGNOSIS — Z01.818 OTHER SPECIFIED PRE-OPERATIVE EXAMINATION: Primary | ICD-10-CM

## 2022-07-27 PROCEDURE — U0005 INFEC AGEN DETEC AMPLI PROBE: HCPCS

## 2022-07-27 PROCEDURE — C9803 HOPD COVID-19 SPEC COLLECT: HCPCS

## 2022-07-27 PROCEDURE — U0004 COV-19 TEST NON-CDC HGH THRU: HCPCS

## 2022-07-28 LAB — SARS-COV-2 RNA PNL SPEC NAA+PROBE: NOT DETECTED

## 2022-10-11 ENCOUNTER — OFFICE VISIT (OUTPATIENT)
Dept: CARDIOLOGY | Facility: CLINIC | Age: 72
End: 2022-10-11

## 2022-10-11 VITALS
SYSTOLIC BLOOD PRESSURE: 144 MMHG | DIASTOLIC BLOOD PRESSURE: 82 MMHG | BODY MASS INDEX: 25.9 KG/M2 | HEART RATE: 60 BPM | WEIGHT: 165 LBS | OXYGEN SATURATION: 99 % | HEIGHT: 67 IN

## 2022-10-11 DIAGNOSIS — I25.10 ARTERIOSCLEROTIC CARDIOVASCULAR DISEASE (ASCVD): Primary | ICD-10-CM

## 2022-10-11 DIAGNOSIS — I10 ESSENTIAL HYPERTENSION: ICD-10-CM

## 2022-10-11 DIAGNOSIS — E78.5 DYSLIPIDEMIA: ICD-10-CM

## 2022-10-11 PROCEDURE — 99214 OFFICE O/P EST MOD 30 MIN: CPT | Performed by: PHYSICIAN ASSISTANT

## 2022-10-11 PROCEDURE — 93000 ELECTROCARDIOGRAM COMPLETE: CPT | Performed by: PHYSICIAN ASSISTANT

## 2022-10-11 RX ORDER — ISOSORBIDE MONONITRATE 120 MG/1
120 TABLET, EXTENDED RELEASE ORAL DAILY
Qty: 90 TABLET | Refills: 3 | Status: SHIPPED | OUTPATIENT
Start: 2022-10-11

## 2022-10-11 NOTE — PROGRESS NOTES
Luci Wilson MD  Duy Schwarz  1950  10/11/2022    Patient Active Problem List   Diagnosis   • Angina, class II-III   • Diabetes (HCC)   • Arteriosclerotic cardiovascular disease (ASCVD), s/p CABG (3V) in 1991  with recent unsuccessful attempt at PCI of severe calcific stenosis of the proximal left circumflex coronary artery.   • Dyslipidemia, pt's PMD monitoring.   • Essential hypertension   • Abnormal stress ECG   • Abnormal stress test   • Microscopic hematuria       Dear Luci Wilson MD:    Subjective     History of Present Illness:    Chief Complaint   Patient presents with   • Med Management     Verbal.    • Chest Pain       Duy Schwarz is a pleasant 72 y.o. male with a past medical history significant for ASCVD, status post CABG in 1991 and subsequent in 2005, He also had left heart catheterization on 7/29/2022 with proximal LAD showing a 99% calcified lesion with a patent graft to the LAD that had a mid lesion .  He also had complete occlusion at the ostium of the left circumflex, patent grafts from the lima to the mid LAD and patent graft from to the RPDA as well. He also has diabetes mellitus and dyslipidemia. He comes in for routine cardiology follow up.     Since Duy was last seen he did have a second opinion on his left circumflex  by Dr. Campos at [facility].  After further evaluation with left heart catheterization was deemed to be high risk procedure and opted to proceed with conservative measurements with medical therapy.  He does have stable angina with persistent chest pains if he walks roughly the length of a football field or gets out of his car multiple times.  He describes this as a pressure sensation.  However at rest he is chest pain-free and is able to form most ADLs if he takes his time.      Allergies   Allergen Reactions   • Valium [Diazepam] Other (See Comments)     Pt says this causes his heart to skip.   :      Current Outpatient Medications:   •   aspirin EC 81 MG EC tablet, Take 1 tablet by mouth Daily., Disp: 100 tablet, Rfl: 2  •  atenolol (TENORMIN) 50 MG tablet, Take 1.5 tablets by mouth Daily. 1.5 tablets daily. (Daily dose of 75 mg)., Disp: 135 tablet, Rfl: 2  •  atorvastatin (LIPITOR) 40 MG tablet, Take 1 tablet by mouth Daily., Disp: 90 tablet, Rfl: 3  •  Cholecalciferol 2000 UNITS capsule, Take 2,000 Units by mouth Daily., Disp: , Rfl:   •  clopidogrel (PLAVIX) 75 MG tablet, Take 1 tablet by mouth Daily., Disp: 90 tablet, Rfl: 3  •  COENZYME Q10 PO, Take 20 mg by mouth Daily., Disp: , Rfl:   •  isosorbide mononitrate (IMDUR) 120 MG 24 hr tablet, Take 1 tablet by mouth Daily., Disp: 90 tablet, Rfl: 3  •  lisinopril (PRINIVIL,ZESTRIL) 10 MG tablet, Take 1 tablet by mouth Daily., Disp: 90 tablet, Rfl: 3  •  MEGARED OMEGA-3 KRILL  MG capsule, Take 1 capsule by mouth 2 (two) times a day., Disp: , Rfl:   •  nitroglycerin (NITROSTAT) 0.4 MG SL tablet, 1 under the tongue as needed for angina, may repeat q5mins for up three doses, Disp: 25 tablet, Rfl: 2  •  pantoprazole (PROTONIX) 20 MG EC tablet, Take 20 mg by mouth Daily., Disp: , Rfl:   •  ranolazine (Ranexa) 1000 MG 12 hr tablet, Take 1 tablet by mouth 2 (Two) Times a Day., Disp: 180 tablet, Rfl: 5  •  repaglinide (PRANDIN) 1 MG tablet, Take 1 mg by mouth 3 (Three) Times a Day Before Meals., Disp: , Rfl:   •  sitaGLIPtin-metFORMIN (JANUMET)  MG per tablet, Take 1 tablet by mouth 2 (two) times a day with meals., Disp: , Rfl:   •  vitamin D3 125 MCG (5000 UT) capsule capsule, Take 5,000 Units by mouth Daily., Disp: , Rfl:     The following portions of the patient's history were reviewed and updated as appropriate: allergies, current medications, past family history, past medical history, past social history, past surgical history and problem list.    Social History     Tobacco Use   • Smoking status: Never   • Smokeless tobacco: Never   Substance Use Topics   • Alcohol use: No   • Drug use:  "No         Objective   Vitals:    10/11/22 1518   BP: 144/82   BP Location: Right arm   Patient Position: Sitting   Cuff Size: Adult   Pulse: 60   SpO2: 99%   Weight: 74.8 kg (165 lb)   Height: 170.2 cm (67\")     Body mass index is 25.84 kg/m².    Constitutional:       General: Not in acute distress.     Appearance: Healthy appearance. Well-developed and not in distress. Not diaphoretic.   Eyes:      Conjunctiva/sclera: Conjunctivae normal.      Pupils: Pupils are equal, round, and reactive to light.   HENT:      Head: Normocephalic and atraumatic.   Neck:      Vascular: No carotid bruit or JVD.   Pulmonary:      Effort: Pulmonary effort is normal. No respiratory distress.      Breath sounds: Normal breath sounds.   Cardiovascular:      Normal rate. Regular rhythm.   Skin:     General: Skin is cool.   Neurological:      Mental Status: Alert, oriented to person, place, and time and oriented to person, place and time.         Lab Results   Component Value Date     08/30/2016    K 3.8 08/30/2016     08/30/2016    CO2 34.0 (H) 08/30/2016    BUN 11 08/30/2016    CREATININE 1.00 09/23/2019    GLUCOSE 91 08/30/2016    CALCIUM 8.8 08/30/2016     No results found for: CKTOTAL  Lab Results   Component Value Date    WBC 6.40 07/29/2022    HGB 13.4 (L) 07/29/2022    HCT 40.1 07/29/2022     07/29/2022     No results found for: INR  No results found for: MG  Lab Results   Component Value Date    PSA 0.416 06/15/2020    TRIG 111 08/29/2016    HDL 31 (L) 08/29/2016    LDL 50 08/29/2016      No results found for: BNP    During this visit the following were done:  Labs Reviewed []    Labs Ordered []    Radiology Reports Reviewed []    Radiology Ordered []    PCP Records Reviewed []    Referring Provider Records Reviewed []    ER Records Reviewed []    Hospital Records Reviewed []    History Obtained From Family []    Radiology Images Reviewed []    Other Reviewed []    Records Requested []         ECG 12 " Lead    Date/Time: 10/11/2022 3:14 PM  Performed by: Jayy Almonte PA-C  Authorized by: Jayy Almonte PA-C   Comparison: compared with previous ECG   Similar to previous ECG  Rhythm: sinus rhythm  Conduction: conduction normal    Clinical impression: normal ECG            Assessment & Plan    Diagnosis Plan   1. Arteriosclerotic cardiovascular disease (ASCVD), s/p CABG (3V) in 1991  with recent unsuccessful attempt at PCI of severe calcific stenosis of the proximal left circumflex coronary artery.        2. Dyslipidemia, pt's PMD monitoring.        3. Essential hypertension                 Recommendations:  1. ASCVD with stable angina  1. I will increase Imdur to 120 mg daily.  2. Reviewed records from [facility]  3. Continue atenolol, Lipitor, Plavix, lisinopril, Ranexa, aspirin.      Return in about 3 months (around 1/11/2023).    As always, I appreciate very much the opportunity to participate in the cardiovascular care of your patients.      With Best Regards,    Jayy Almonte PA-C

## 2022-10-17 ENCOUNTER — TELEPHONE (OUTPATIENT)
Dept: CARDIOLOGY | Facility: CLINIC | Age: 72
End: 2022-10-17

## 2022-10-17 NOTE — TELEPHONE ENCOUNTER
Requested.     ----- Message from Jayy Almonte PA-C sent at 10/11/2022  3:56 PM EDT -----  Can we get recent labs from pcp?

## 2023-01-16 ENCOUNTER — TELEPHONE (OUTPATIENT)
Dept: CARDIOLOGY | Facility: CLINIC | Age: 73
End: 2023-01-16

## 2023-02-07 ENCOUNTER — OFFICE VISIT (OUTPATIENT)
Dept: CARDIOLOGY | Facility: CLINIC | Age: 73
End: 2023-02-07
Payer: COMMERCIAL

## 2023-02-07 VITALS
HEART RATE: 74 BPM | WEIGHT: 163.6 LBS | HEIGHT: 67 IN | SYSTOLIC BLOOD PRESSURE: 117 MMHG | OXYGEN SATURATION: 98 % | BODY MASS INDEX: 25.68 KG/M2 | DIASTOLIC BLOOD PRESSURE: 67 MMHG

## 2023-02-07 DIAGNOSIS — E78.5 DYSLIPIDEMIA: Primary | ICD-10-CM

## 2023-02-07 DIAGNOSIS — I25.10 ARTERIOSCLEROTIC CARDIOVASCULAR DISEASE (ASCVD): ICD-10-CM

## 2023-02-07 PROCEDURE — 99213 OFFICE O/P EST LOW 20 MIN: CPT | Performed by: NURSE PRACTITIONER

## 2023-02-07 NOTE — PROGRESS NOTES
[facility]             [facility] NICK Jonas Maria C., MD  Duy Schwarz  1950 02/07/2023    Patient Active Problem List   Diagnosis   • Angina, class II-III   • Diabetes (HCC)   • Arteriosclerotic cardiovascular disease (ASCVD), s/p CABG (3V) in 1991  with recent unsuccessful attempt at PCI of severe calcific stenosis of the proximal left circumflex coronary artery.   • Dyslipidemia, pt's PMD monitoring.   • Essential hypertension   • Abnormal stress ECG   • Abnormal stress test   • Microscopic hematuria       Dear Luci Wilson MD:    Subjective     Chief Complaint   Patient presents with   • Follow-up       HPI:     This is a 72 y.o. male with known past medical history of ASCVD status post CABG in 1991 and subsequent in 2005 with left heart catheterization in 2022 with proximal LAD showing 99% calcified lesion with a patent graft to the LAD that had a mid lesion  with complete occlusion at the ostium of the left circumflex, patent graft from the LIMA to the mid LAD and patent graft to the RPDA, essential hypertension, diabetes mellitus type 2 and dyslipidemia.    Duy Schwarz presents today for routine cardiology follow up.  Patient states that his last visit he has been doing overall well.  Chest pain remains at baseline specifically when he overexerts himself.  Denies any shortness of breath, palpitations or syncope.  Reports compliance with medications.  States he had recent lab work by his PCP which is not available for review today.    • Diagnostic Testing  1. Nuclear stress test 7/2022: There is a medium-size, moderate perfusion defect located in the basal  anterior myocardium. This defect is reversible consistent with ischemia in  the left anterior descending coronary arterial distribution. There is a  second large, moderate perfusion defect located in the basal-distal  anterolateral and lateral myocardium. This defect is reversible  consistent  with ischemia in the left circumflex coronary arterial distribution.   2. Left heart catheterization 8/2022: Coronary Findings Diagnostic Dominance: Right   Left Main: Large vessel originating from the left coronary cusp. It bifurcates into the LAD and LCx. Angiographically normal.   Left Anterior Descending: Large caliber vessel with a 99% calcified lesion proximally. It is completely occluded mid-vessel. Patent graft to LAD.   Left Circumflex: There is a complete occlusion starting at the ostium of the vessel   Right Coronary Artery: Large caliber vessel originating from the right coronary cusp. It gives off the PL and PDA branches and is dominant for posterior circulation. There is a 20% calcified proximal focal lesion. The rest of the vessel has diffuse luminal irregularities. The rPDA is completely occluded (known). Patent graft to rPDA   LIMA Graft To Mid LAD: Patient graft.   Graft To RPDA: Patent graft.       All other systems were reviewed and were negative.    Patient Active Problem List   Diagnosis   • Angina, class II-III   • Diabetes (HCC)   • Arteriosclerotic cardiovascular disease (ASCVD), s/p CABG (3V) in 1991  with recent unsuccessful attempt at PCI of severe calcific stenosis of the proximal left circumflex coronary artery.   • Dyslipidemia, pt's PMD monitoring.   • Essential hypertension   • Abnormal stress ECG   • Abnormal stress test   • Microscopic hematuria       family history includes No Known Problems in his father and mother.     reports that he has never smoked. He has never used smokeless tobacco. He reports that he does not drink alcohol and does not use drugs.    Allergies   Allergen Reactions   • Valium [Diazepam] Other (See Comments)     Pt says this causes his heart to skip.         Current Outpatient Medications:   •  aspirin EC 81 MG EC tablet, Take 1 tablet by mouth Daily., Disp: 100 tablet, Rfl: 2  •  atenolol (TENORMIN) 50 MG tablet, Take 1.5 tablets by mouth Daily.  1.5 tablets daily. (Daily dose of 75 mg)., Disp: 135 tablet, Rfl: 2  •  atorvastatin (LIPITOR) 40 MG tablet, Take 1 tablet by mouth Daily., Disp: 90 tablet, Rfl: 3  •  Cholecalciferol 2000 UNITS capsule, Take 2,000 Units by mouth Daily., Disp: , Rfl:   •  clopidogrel (PLAVIX) 75 MG tablet, Take 1 tablet by mouth Daily., Disp: 90 tablet, Rfl: 3  •  COENZYME Q10 PO, Take 20 mg by mouth Daily., Disp: , Rfl:   •  isosorbide mononitrate (IMDUR) 120 MG 24 hr tablet, Take 1 tablet by mouth Daily., Disp: 90 tablet, Rfl: 3  •  lisinopril (PRINIVIL,ZESTRIL) 10 MG tablet, Take 1 tablet by mouth Daily., Disp: 90 tablet, Rfl: 3  •  MEGARED OMEGA-3 KRILL  MG capsule, Take 1 capsule by mouth 2 (two) times a day., Disp: , Rfl:   •  nitroglycerin (NITROSTAT) 0.4 MG SL tablet, 1 under the tongue as needed for angina, may repeat q5mins for up three doses, Disp: 25 tablet, Rfl: 2  •  pantoprazole (PROTONIX) 20 MG EC tablet, Take 20 mg by mouth Daily., Disp: , Rfl:   •  ranolazine (Ranexa) 1000 MG 12 hr tablet, Take 1 tablet by mouth 2 (Two) Times a Day., Disp: 180 tablet, Rfl: 5  •  repaglinide (PRANDIN) 1 MG tablet, Take 1 mg by mouth 3 (Three) Times a Day Before Meals., Disp: , Rfl:   •  sitaGLIPtin-metFORMIN (JANUMET)  MG per tablet, Take 1 tablet by mouth 2 (two) times a day with meals., Disp: , Rfl:   •  vitamin D3 125 MCG (5000 UT) capsule capsule, Take 5,000 Units by mouth Daily., Disp: , Rfl:       Physical Exam:  I have reviewed the patient's current vital signs as documented in the patient's EMR.   Vitals:    02/07/23 1452   BP: 117/67   Pulse: 74   SpO2: 98%     Body mass index is 25.62 kg/m².       02/07/23  1452   Weight: 74.2 kg (163 lb 9.6 oz)      Physical Exam  Constitutional:       General: He is not in acute distress.     Appearance: Normal appearance. He is well-developed.   HENT:      Head: Normocephalic and atraumatic.      Mouth/Throat:      Mouth: Mucous membranes are moist.   Eyes:       Extraocular Movements: Extraocular movements intact.      Pupils: Pupils are equal, round, and reactive to light.   Neck:      Vascular: No JVD.   Cardiovascular:      Rate and Rhythm: Normal rate and regular rhythm.      Heart sounds: Normal heart sounds. No murmur heard.    No S3 or S4 sounds.   Pulmonary:      Effort: Pulmonary effort is normal. No respiratory distress.      Breath sounds: Normal breath sounds. No wheezing.   Abdominal:      General: Bowel sounds are normal. There is no distension.      Palpations: Abdomen is soft. There is no hepatomegaly.      Tenderness: There is no abdominal tenderness.   Musculoskeletal:         General: Normal range of motion.      Cervical back: Normal range of motion and neck supple.   Skin:     General: Skin is warm and dry.      Coloration: Skin is not jaundiced or pale.   Neurological:      General: No focal deficit present.      Mental Status: He is alert and oriented to person, place, and time. Mental status is at baseline.   Psychiatric:         Mood and Affect: Mood normal.         Behavior: Behavior normal.         Thought Content: Thought content normal.         Judgment: Judgment normal.          DATA REVIEWED:     TTE/JACQUIE:  Results for orders placed during the hospital encounter of 08/29/16    Adult transthoracic echo complete with contrast    Interpretation Summary  · Left ventricular diastolic dysfunction (grade II) consistent with pseudonormalization is noted.  · Left ventricular wall thickness is consistent with mild asymmetric septal hypertrophy.  · The estimated LVEF is 60% with no observed segmental wall motion abnormalities.  · There is trace mitral regurgitation.      Laboratory evaluations:    Lab Results   Component Value Date    GLUCOSE 91 08/30/2016    BUN 11 08/30/2016    CREATININE 1.00 09/23/2019    EGFRIFNONA 113 08/30/2016    BCR 15.7 08/30/2016    K 3.8 08/30/2016    CO2 34.0 (H) 08/30/2016    CALCIUM 8.8 08/30/2016     Lab Results    Component Value Date    WBC 6.40 07/29/2022    HGB 13.4 (L) 07/29/2022    HCT 40.1 07/29/2022    MCV 92 07/29/2022     07/29/2022     Lab Results   Component Value Date    CHOL 101 08/29/2016    TRIG 111 08/29/2016    HDL 31 (L) 08/29/2016    LDL 50 08/29/2016     No results found for: TSH, W4HVMFK, V4COOBM, THYROIDAB  Lab Results   Component Value Date    HGBA1C 6.10 (H) 08/29/2016     No results found for: ALT  Lab Results   Component Value Date    HGBA1C 6.10 (H) 08/29/2016     Lab Results   Component Value Date    CREATININE 1.00 09/23/2019     No results found for: IRON, TIBC, FERRITIN  No results found for: INR, PROTIME        --------------------------------------------------------------------------------------------------------------------------    ASSESSMENT/PLAN:      Diagnosis Plan   1. Dyslipidemia, pt's PMD monitoring.        2. Arteriosclerotic cardiovascular disease (ASCVD), s/p CABG (3V) in 1991  with recent unsuccessful attempt at PCI of severe calcific stenosis of the proximal left circumflex coronary artery.            1. ASCVD  2. Dyslipidemia  • Records reviewed from [facility] for which patient was deemed high risk for further intervention.  Recommendation was to continue with medical management.  Continue with aspirin, atenolol, Lipitor, Plavix, Imdur, lisinopril.  • Reports recent lab panel from his PCP.  We will request these and adjust statin as needed.    This document has been @Electronically signed by Debbie Farr MA, 02/07/23, 2:54 PM EST.       Dictated Utilizing Dragon Dictation: Part of this note may be an electronic transcription/translation of spoken language to printed text using the Dragon Dictation System.    Follow-up appointment and medication changes provided in hand delivered After Visit Summary as well as reviewed in the room.

## 2023-02-10 ENCOUNTER — TELEPHONE (OUTPATIENT)
Dept: CARDIOLOGY | Facility: CLINIC | Age: 73
End: 2023-02-10
Payer: COMMERCIAL

## 2023-02-10 NOTE — TELEPHONE ENCOUNTER
----- Message from NICK [place] sent at 2/7/2023  3:33 PM EST -----  Please requst labs from PCP      REQUESTED

## 2023-05-08 ENCOUNTER — OFFICE VISIT (OUTPATIENT)
Dept: CARDIOLOGY | Facility: CLINIC | Age: 73
End: 2023-05-08
Payer: COMMERCIAL

## 2023-05-08 VITALS
RESPIRATION RATE: 16 BRPM | HEIGHT: 66 IN | HEART RATE: 65 BPM | BODY MASS INDEX: 25.3 KG/M2 | OXYGEN SATURATION: 98 % | DIASTOLIC BLOOD PRESSURE: 73 MMHG | WEIGHT: 157.4 LBS | SYSTOLIC BLOOD PRESSURE: 123 MMHG

## 2023-05-08 DIAGNOSIS — I25.10 ARTERIOSCLEROTIC CARDIOVASCULAR DISEASE (ASCVD): Primary | ICD-10-CM

## 2023-05-08 DIAGNOSIS — I10 ESSENTIAL HYPERTENSION: ICD-10-CM

## 2023-05-08 DIAGNOSIS — E78.5 DYSLIPIDEMIA: ICD-10-CM

## 2023-05-08 PROCEDURE — 3074F SYST BP LT 130 MM HG: CPT | Performed by: PHYSICIAN ASSISTANT

## 2023-05-08 PROCEDURE — 1159F MED LIST DOCD IN RCRD: CPT | Performed by: PHYSICIAN ASSISTANT

## 2023-05-08 PROCEDURE — 93000 ELECTROCARDIOGRAM COMPLETE: CPT | Performed by: PHYSICIAN ASSISTANT

## 2023-05-08 PROCEDURE — 1160F RVW MEDS BY RX/DR IN RCRD: CPT | Performed by: PHYSICIAN ASSISTANT

## 2023-05-08 PROCEDURE — 99213 OFFICE O/P EST LOW 20 MIN: CPT | Performed by: PHYSICIAN ASSISTANT

## 2023-05-08 PROCEDURE — 3078F DIAST BP <80 MM HG: CPT | Performed by: PHYSICIAN ASSISTANT

## 2023-05-08 NOTE — PROGRESS NOTES
Luci Wilson MD  Duy Schwarz  1950 05/08/2023    Patient Active Problem List   Diagnosis   • Angina, class II-III   • Diabetes   • Arteriosclerotic cardiovascular disease (ASCVD), s/p CABG (3V) in 1991  with recent unsuccessful attempt at PCI of severe calcific stenosis of the proximal left circumflex coronary artery.   • Dyslipidemia, pt's PMD monitoring.   • Essential hypertension   • Abnormal stress ECG   • Abnormal stress test   • Microscopic hematuria       Dear Luci Wilson MD:    Subjective     History of Present Illness:    Chief Complaint   Patient presents with   • Follow-up     3 mos   • Chest Pain     With mild-mod exertion   • Med Management     verbal       Duy Schwarz is a pleasant 73 y.o. male with a past medical history significant for          Allergies   Allergen Reactions   • Valium [Diazepam] Other (See Comments)     Pt says this causes his heart to skip.   :      Current Outpatient Medications:   •  aspirin EC 81 MG EC tablet, Take 1 tablet by mouth Daily., Disp: 100 tablet, Rfl: 2  •  atenolol (TENORMIN) 50 MG tablet, Take 1.5 tablets by mouth Daily. 1.5 tablets daily. (Daily dose of 75 mg)., Disp: 135 tablet, Rfl: 2  •  atorvastatin (LIPITOR) 40 MG tablet, Take 1 tablet by mouth Daily., Disp: 90 tablet, Rfl: 3  •  Cholecalciferol 2000 UNITS capsule, Take 1 capsule by mouth Daily., Disp: , Rfl:   •  clopidogrel (PLAVIX) 75 MG tablet, Take 1 tablet by mouth Daily., Disp: 90 tablet, Rfl: 3  •  COENZYME Q10 PO, Take 20 mg by mouth Daily., Disp: , Rfl:   •  isosorbide mononitrate (IMDUR) 120 MG 24 hr tablet, Take 1 tablet by mouth Daily., Disp: 90 tablet, Rfl: 3  •  lisinopril (PRINIVIL,ZESTRIL) 10 MG tablet, Take 1 tablet by mouth Daily., Disp: 90 tablet, Rfl: 3  •  MEGARED OMEGA-3 KRILL  MG capsule, Take 1 capsule by mouth 2 (two) times a day., Disp: , Rfl:   •  nitroglycerin (NITROSTAT) 0.4 MG SL tablet, 1 under the tongue as needed for angina, may repeat q5mins  "for up three doses, Disp: 25 tablet, Rfl: 2  •  pantoprazole (PROTONIX) 20 MG EC tablet, Take 1 tablet by mouth Daily., Disp: , Rfl:   •  ranolazine (Ranexa) 1000 MG 12 hr tablet, Take 1 tablet by mouth 2 (Two) Times a Day., Disp: 180 tablet, Rfl: 5  •  repaglinide (PRANDIN) 1 MG tablet, Take 1 tablet by mouth 3 (Three) Times a Day Before Meals., Disp: , Rfl:   •  sitaGLIPtin-metFORMIN (JANUMET)  MG per tablet, Take 1 tablet by mouth 2 (Two) Times a Day With Meals., Disp: , Rfl:   •  vitamin D3 125 MCG (5000 UT) capsule capsule, Take 1 capsule by mouth Daily., Disp: , Rfl:     The following portions of the patient's history were reviewed and updated as appropriate: allergies, current medications, past family history, past medical history, past social history, past surgical history and problem list.    Social History     Tobacco Use   • Smoking status: Never   • Smokeless tobacco: Never   Substance Use Topics   • Alcohol use: No   • Drug use: No         Advance Care Planning   {Advance Directive Status:38735}          Objective   Vitals:    05/08/23 1505   BP: 123/73   Pulse: 65   Resp: 16   SpO2: 98%   Weight: 71.4 kg (157 lb 6.4 oz)   Height: 167.6 cm (66\")     Body mass index is 25.41 kg/m².    Physical Exam    Lab Results   Component Value Date     08/30/2016    K 3.8 08/30/2016     08/30/2016    CO2 34.0 (H) 08/30/2016    BUN 11 08/30/2016    CREATININE 1.00 09/23/2019    GLUCOSE 91 08/30/2016    CALCIUM 8.8 08/30/2016     No results found for: CKTOTAL  Lab Results   Component Value Date    WBC 6.40 07/29/2022    HGB 13.4 (L) 07/29/2022    HCT 40.1 07/29/2022     07/29/2022     No results found for: INR  No results found for: MG  Lab Results   Component Value Date    PSA 0.416 06/15/2020    TRIG 111 08/29/2016    HDL 31 (L) 08/29/2016    LDL 50 08/29/2016      No results found for: BNP    During this visit the following were done:  Labs Reviewed []    Labs Ordered []    Radiology " Reports Reviewed []    Radiology Ordered []    PCP Records Reviewed []    Referring Provider Records Reviewed []    ER Records Reviewed []    Hospital Records Reviewed []    History Obtained From Family []    Radiology Images Reviewed []    Other Reviewed []    Records Requested []       Procedures    Assessment & Plan   No diagnosis found.         Recommendations:  1.     No follow-ups on file.    As always, I appreciate very much the opportunity to participate in the cardiovascular care of your patients.      With Best Regards,    Jayy Almonte PA-C

## 2023-05-08 NOTE — PROGRESS NOTES
Luci Wilson MD  Duy Schwarz  1950 05/08/2023    Patient Active Problem List   Diagnosis   • Angina, class II-III   • Diabetes   • Arteriosclerotic cardiovascular disease (ASCVD), s/p CABG (3V) in 1991  with recent unsuccessful attempt at PCI of severe calcific stenosis of the proximal left circumflex coronary artery.   • Dyslipidemia, pt's PMD monitoring.   • Essential hypertension   • Abnormal stress ECG   • Abnormal stress test   • Microscopic hematuria       Dear Luci Wilson MD:    Subjective     History of Present Illness:    Chief Complaint   Patient presents with   • Follow-up     3 mos   • Chest Pain     With mild-mod exertion   • Med Management     verbal       Duy Schwarz is a pleasant 73 y.o. male with a past medical history significant for ASCVD, status post CABG in 1991 and subsequent in 2005, He also had left heart catheterization on 7/29/2022 with proximal LAD showing a 99% calcified lesion with a patent graft to the LAD that had a mid lesion .  He also had complete occlusion at the ostium of the left circumflex, patent grafts from the lima to the mid LAD and patent graft from to the RPDA as well. He also has diabetes mellitus and dyslipidemia. He comes in for routine cardiology follow up.     Clinically Duy reports he has been doing well and has no complaints with open questions.  Upon further questioning he does endorse some chest pains which are chronic when he performs moderate or greater exertion and this is chronic and has been deemed too high risk for further coronary intervention.  He does understand this.  He denies any dyspnea worsening from baseline, palpitations, dizziness, or syncope.          Allergies   Allergen Reactions   • Valium [Diazepam] Other (See Comments)     Pt says this causes his heart to skip.   :   Advance Care Planning   ACP discussion was held with the patient during this visit. Patient does not have an advance directive, information  provided.        Current Outpatient Medications:   •  aspirin EC 81 MG EC tablet, Take 1 tablet by mouth Daily., Disp: 100 tablet, Rfl: 2  •  atenolol (TENORMIN) 50 MG tablet, Take 1.5 tablets by mouth Daily. 1.5 tablets daily. (Daily dose of 75 mg)., Disp: 135 tablet, Rfl: 2  •  atorvastatin (LIPITOR) 40 MG tablet, Take 1 tablet by mouth Daily., Disp: 90 tablet, Rfl: 3  •  Cholecalciferol 2000 UNITS capsule, Take 1 capsule by mouth Daily., Disp: , Rfl:   •  clopidogrel (PLAVIX) 75 MG tablet, Take 1 tablet by mouth Daily., Disp: 90 tablet, Rfl: 3  •  COENZYME Q10 PO, Take 20 mg by mouth Daily., Disp: , Rfl:   •  isosorbide mononitrate (IMDUR) 120 MG 24 hr tablet, Take 1 tablet by mouth Daily., Disp: 90 tablet, Rfl: 3  •  lisinopril (PRINIVIL,ZESTRIL) 10 MG tablet, Take 1 tablet by mouth Daily., Disp: 90 tablet, Rfl: 3  •  MEGARED OMEGA-3 KRILL  MG capsule, Take 1 capsule by mouth 2 (two) times a day., Disp: , Rfl:   •  nitroglycerin (NITROSTAT) 0.4 MG SL tablet, 1 under the tongue as needed for angina, may repeat q5mins for up three doses, Disp: 25 tablet, Rfl: 2  •  pantoprazole (PROTONIX) 20 MG EC tablet, Take 1 tablet by mouth Daily., Disp: , Rfl:   •  ranolazine (Ranexa) 1000 MG 12 hr tablet, Take 1 tablet by mouth 2 (Two) Times a Day., Disp: 180 tablet, Rfl: 5  •  repaglinide (PRANDIN) 1 MG tablet, Take 1 tablet by mouth 3 (Three) Times a Day Before Meals., Disp: , Rfl:   •  sitaGLIPtin-metFORMIN (JANUMET)  MG per tablet, Take 1 tablet by mouth 2 (Two) Times a Day With Meals., Disp: , Rfl:   •  vitamin D3 125 MCG (5000 UT) capsule capsule, Take 1 capsule by mouth Daily., Disp: , Rfl:     The following portions of the patient's history were reviewed and updated as appropriate: allergies, current medications, past family history, past medical history, past social history, past surgical history and problem list.    Social History     Tobacco Use   • Smoking status: Never   • Smokeless tobacco: Never  "  Substance Use Topics   • Alcohol use: No   • Drug use: No         Objective   Vitals:    05/08/23 1505   BP: 123/73   Pulse: 65   Resp: 16   SpO2: 98%   Weight: 71.4 kg (157 lb 6.4 oz)   Height: 167.6 cm (66\")     Body mass index is 25.41 kg/m².    Constitutional:       General: Not in acute distress.     Appearance: Healthy appearance. Well-developed and not in distress. Not diaphoretic.   Eyes:      Conjunctiva/sclera: Conjunctivae normal.      Pupils: Pupils are equal, round, and reactive to light.   HENT:      Head: Normocephalic and atraumatic.   Neck:      Vascular: No carotid bruit or JVD.   Pulmonary:      Effort: Pulmonary effort is normal. No respiratory distress.      Breath sounds: Normal breath sounds.   Cardiovascular:      Normal rate. Regular rhythm.   Skin:     General: Skin is cool.   Neurological:      Mental Status: Alert, oriented to person, place, and time and oriented to person, place and time.         Lab Results   Component Value Date     08/30/2016    K 3.8 08/30/2016     08/30/2016    CO2 34.0 (H) 08/30/2016    BUN 11 08/30/2016    CREATININE 1.00 09/23/2019    GLUCOSE 91 08/30/2016    CALCIUM 8.8 08/30/2016     No results found for: CKTOTAL  Lab Results   Component Value Date    WBC 6.40 07/29/2022    HGB 13.4 (L) 07/29/2022    HCT 40.1 07/29/2022     07/29/2022     No results found for: INR  No results found for: MG  Lab Results   Component Value Date    PSA 0.416 06/15/2020    TRIG 111 08/29/2016    HDL 31 (L) 08/29/2016    LDL 50 08/29/2016      No results found for: BNP    During this visit the following were done:  Labs Reviewed []    Labs Ordered []    Radiology Reports Reviewed []    Radiology Ordered []    PCP Records Reviewed []    Referring Provider Records Reviewed []    ER Records Reviewed []    Hospital Records Reviewed []    History Obtained From Family []    Radiology Images Reviewed []    Other Reviewed []    Records Requested []         ECG 12 " Lead    Date/Time: 5/8/2023 3:11 PM  Performed by: Jayy Almonte PA-C  Authorized by: Jayy Almonte PA-C   Comparison: compared with previous ECG   Similar to previous ECG  Rhythm: sinus rhythm  Conduction: conduction normal  ST Segments: ST segments normal    Clinical impression: normal ECG            Assessment & Plan    Diagnosis Plan   1. Arteriosclerotic cardiovascular disease (ASCVD), s/p CABG (3V) in 1991  with recent unsuccessful attempt at PCI of severe calcific stenosis of the proximal left circumflex coronary artery.  ECG 12 Lead      2. Essential hypertension        3. Dyslipidemia, pt's PMD monitoring.             Recommendations:  1. ASCVD  a. Stable angina overall well-controlled.  Patient understands with his advanced CAD will be difficult to be completely chest pain-free.   b. Continue atenolol, Lipitor, Plavix, Imdur, lisinopril, Ranexa, aspirin.    Return in about 7 months (around 12/8/2023).    As always, I appreciate very much the opportunity to participate in the cardiovascular care of your patients.      With Best Regards,    Jayy Almonet PA-C

## 2023-12-27 ENCOUNTER — TELEPHONE (OUTPATIENT)
Dept: CARDIOLOGY | Facility: CLINIC | Age: 73
End: 2023-12-27
Payer: COMMERCIAL

## 2023-12-27 ENCOUNTER — OFFICE VISIT (OUTPATIENT)
Dept: CARDIOLOGY | Facility: CLINIC | Age: 73
End: 2023-12-27
Payer: MEDICARE

## 2023-12-27 VITALS
OXYGEN SATURATION: 99 % | DIASTOLIC BLOOD PRESSURE: 73 MMHG | WEIGHT: 155 LBS | BODY MASS INDEX: 24.91 KG/M2 | RESPIRATION RATE: 18 BRPM | SYSTOLIC BLOOD PRESSURE: 124 MMHG | HEIGHT: 66 IN | HEART RATE: 73 BPM

## 2023-12-27 DIAGNOSIS — I25.10 ARTERIOSCLEROTIC CARDIOVASCULAR DISEASE (ASCVD): Primary | ICD-10-CM

## 2023-12-27 DIAGNOSIS — I10 ESSENTIAL HYPERTENSION: ICD-10-CM

## 2023-12-27 PROCEDURE — 3074F SYST BP LT 130 MM HG: CPT | Performed by: PHYSICIAN ASSISTANT

## 2023-12-27 PROCEDURE — 3078F DIAST BP <80 MM HG: CPT | Performed by: PHYSICIAN ASSISTANT

## 2023-12-27 PROCEDURE — 1159F MED LIST DOCD IN RCRD: CPT | Performed by: PHYSICIAN ASSISTANT

## 2023-12-27 PROCEDURE — 1160F RVW MEDS BY RX/DR IN RCRD: CPT | Performed by: PHYSICIAN ASSISTANT

## 2023-12-27 PROCEDURE — 99213 OFFICE O/P EST LOW 20 MIN: CPT | Performed by: PHYSICIAN ASSISTANT

## 2023-12-27 NOTE — PROGRESS NOTES
Chemotherapy Administration    Pre-assessment Data: Antineoplastic Agents  Other:   See toxicity flow sheet for assessment [x]     Physician Notification of Concerns Related to Chemotherapy Administration:   Physician Notified Rhys Rubio / Time of Notification Saw dr Gayla Bravo     Interventions:   Lab work assessed  [x]   Height / Weight verified for dose [x]   Current MAR reviewed [x]   Emergency drugs available as appropriate [x]   Anaphylaxis assessment completed [x]   Pre-medications administered as ordered [x]   Blood return noted upon initiation of chemotherapy [x]   Blood return noted each 1-2ml of a vesicant medication if given IV push []   Blood return noted each 2-3ml of a non-vesicant medication if given IV push []   Monitor for signs / symptoms of hypersensitivity reaction [x]   Chemotherapy orders (drug/dose/rate) verified by 2 Chemo certified RNs [x]   Monitor IV site and blood return throughout the infusion of the medication [x]   Document IV site checks on the IV assessment form [x]   Document chemotherapy teaching on the Patient Education tab [x]   Document patient verbalizes understanding of medications being administered [x]   If IV infiltration, see ONS Guidelines []   Other:      []         Taxol Administration:  Taxol is filtered, use specific tubing for administration. If hypersensitivity reaction occurs, STOP TAXOL, maintain plain IV and notify physician for additional orders. Taxol is considered an irritant in low doses. High dose Taxol is considered a vesicant. Check with physician if infusion should be through a central line.    Neurological assessment completed pre administration [x]   Pre-medications administered as ordered [x]   Document baseline vital signs before administration [x]   For 3 hour Taxol: Document blood pressure, pulse, respiratory rate every 15 min times 1 hour after the start of Taxol [x]   For 1 hour Taxol: Document blood pressure, pulse, respiratory rate 15 min after the Luci Wilson MD  Duy Schwarz  1950 12/27/2023    Patient Active Problem List   Diagnosis    Angina, class II-III    Diabetes    Arteriosclerotic cardiovascular disease (ASCVD), s/p CABG (3V) in 1991  with recent unsuccessful attempt at PCI of severe calcific stenosis of the proximal left circumflex coronary artery.    Dyslipidemia, pt's PMD monitoring.    Essential hypertension    Abnormal stress ECG    Abnormal stress test    Microscopic hematuria       Dear Luci Wilson MD:    Subjective     History of Present Illness:    Chief Complaint   Patient presents with    Arteriosclerotic cardiovascular disease       Duy Schwarz is a pleasant 73 y.o. male with a past medical history significant for  ASCVD, status post CABG in 1991 and subsequent in 2005, He also had left heart catheterization on 7/29/2022 with proximal LAD showing a 99% calcified lesion with a patent graft to the LAD that had a mid lesion .  He also had complete occlusion at the ostium of the left circumflex, patent grafts from the lima to the mid LAD and patent graft from to the RPDA as well. He also has diabetes mellitus and dyslipidemia. He comes in for routine cardiology follow up.      Duy reports he has been stable since he was last seen.  He does still have occasional chronic chest pains but reports these are mild and able to form all of his ADLs without limitations.  He reports as long as he takes his time and takes breaks he does not develop any chest pains or worsening shortness of breath from baseline.  He also denies any falls syncope, or near syncope.    Allergies   Allergen Reactions    Valium [Diazepam] Other (See Comments)     Pt says this causes his heart to skip.   :      Current Outpatient Medications:     aspirin EC 81 MG EC tablet, Take 1 tablet by mouth Daily., Disp: 100 tablet, Rfl: 2    atenolol (TENORMIN) 50 MG tablet, Take 1.5 tablets by mouth Daily. 1.5 tablets daily. (Daily dose of 75 mg)., Disp:  135 tablet, Rfl: 2    atorvastatin (LIPITOR) 40 MG tablet, Take 1 tablet by mouth Daily., Disp: 90 tablet, Rfl: 3    Cholecalciferol 2000 UNITS capsule, Take 1 capsule by mouth Daily., Disp: , Rfl:     clopidogrel (PLAVIX) 75 MG tablet, Take 1 tablet by mouth Daily., Disp: 90 tablet, Rfl: 3    COENZYME Q10 PO, Take 20 mg by mouth Daily., Disp: , Rfl:     isosorbide mononitrate (IMDUR) 120 MG 24 hr tablet, Take 1 tablet by mouth Daily., Disp: 90 tablet, Rfl: 3    lisinopril (PRINIVIL,ZESTRIL) 10 MG tablet, Take 1 tablet by mouth Daily., Disp: 90 tablet, Rfl: 3    MEGARED OMEGA-3 KRILL  MG capsule, Take 1 capsule by mouth 2 (two) times a day., Disp: , Rfl:     nitroglycerin (NITROSTAT) 0.4 MG SL tablet, 1 under the tongue as needed for angina, may repeat q5mins for up three doses, Disp: 25 tablet, Rfl: 2    pantoprazole (PROTONIX) 20 MG EC tablet, Take 1 tablet by mouth Daily., Disp: , Rfl:     ranolazine (Ranexa) 1000 MG 12 hr tablet, Take 1 tablet by mouth 2 (Two) Times a Day., Disp: 180 tablet, Rfl: 5    repaglinide (PRANDIN) 1 MG tablet, Take 1 tablet by mouth 3 (Three) Times a Day Before Meals., Disp: , Rfl:     sitaGLIPtin-metFORMIN (JANUMET)  MG per tablet, Take 1 tablet by mouth 2 (Two) Times a Day With Meals., Disp: , Rfl:     vitamin D3 125 MCG (5000 UT) capsule capsule, Take 1 capsule by mouth Daily., Disp: , Rfl:     The following portions of the patient's history were reviewed and updated as appropriate: allergies, current medications, past family history, past medical history, past social history, past surgical history and problem list.    Social History     Tobacco Use    Smoking status: Never    Smokeless tobacco: Never   Vaping Use    Vaping Use: Never used   Substance Use Topics    Alcohol use: No    Drug use: No         Objective   Vitals:    12/27/23 1456   BP: 124/73   BP Location: Left arm   Patient Position: Sitting   Cuff Size: Adult   Pulse: 73   Resp: 18   SpO2: 99%   Weight:  start of Taxol []   Document blood pressure, pulse, respiratory rate at the completion of Taxol [x]   Other:     [] "70.3 kg (155 lb)   Height: 167.6 cm (66\")     Body mass index is 25.02 kg/m².    ROS    Constitutional:       General: Not in acute distress.     Appearance: Healthy appearance. Well-developed and not in distress. Not diaphoretic.   Eyes:      Conjunctiva/sclera: Conjunctivae normal.      Pupils: Pupils are equal, round, and reactive to light.   HENT:      Head: Normocephalic and atraumatic.   Neck:      Vascular: No carotid bruit or JVD.   Pulmonary:      Effort: Pulmonary effort is normal. No respiratory distress.      Breath sounds: Normal breath sounds.   Cardiovascular:      Normal rate. Regular rhythm.   Edema:     Peripheral edema absent.   Skin:     General: Skin is cool.   Neurological:      Mental Status: Alert, oriented to person, place, and time and oriented to person, place and time.         Lab Results   Component Value Date     08/30/2016    K 3.8 08/30/2016     08/30/2016    CO2 34.0 (H) 08/30/2016    BUN 11 08/30/2016    CREATININE 1.00 09/23/2019    GLUCOSE 91 08/30/2016    CALCIUM 8.8 08/30/2016     No results found for: \"CKTOTAL\"  Lab Results   Component Value Date    WBC 6.40 07/29/2022    HGB 13.4 (L) 07/29/2022    HCT 40.1 07/29/2022     07/29/2022     No results found for: \"INR\"  No results found for: \"MG\"  Lab Results   Component Value Date    PSA 0.416 06/15/2020    TRIG 111 08/29/2016    HDL 31 (L) 08/29/2016    LDL 50 08/29/2016      No results found for: \"BNP\"    During this visit the following were done:  Labs Reviewed []    Labs Ordered []    Radiology Reports Reviewed []    Radiology Ordered []    PCP Records Reviewed []    Referring Provider Records Reviewed []    ER Records Reviewed []    Hospital Records Reviewed []    History Obtained From Family []    Radiology Images Reviewed []    Other Reviewed []    Records Requested []       Procedures    Assessment & Plan    Diagnosis Plan   1. Arteriosclerotic cardiovascular disease (ASCVD), s/p CABG (3V) in 1991  with " recent unsuccessful attempt at PCI of severe calcific stenosis of the proximal left circumflex coronary artery.        2. Essential hypertension                 Recommendations:  ASCVD  Overall stable he reports some chronic chest pains if he overexerts himself however as long as he takes his time and rests he does not develop any symptoms.  Given his advanced CAD he is aware that becoming completely chest pain-free will be difficult.  I will continue current GDMT with aspirin, atenolol, Lipitor, Plavix, Imdur, lisinopril, Ranexa.  Essential hypertension  Excellently controlled    Return in about 6 months (around 6/27/2024).    As always, I appreciate very much the opportunity to participate in the cardiovascular care of your patients.      With Best Regards,    Jayy Almonte PA-C

## 2023-12-27 NOTE — TELEPHONE ENCOUNTER
Sent fax request for labs.       ----- Message from Jayy Almonte PA-C sent at 12/27/2023  3:08 PM EST -----  Can we get labs from pcp

## 2024-06-27 ENCOUNTER — TELEPHONE (OUTPATIENT)
Dept: CARDIOLOGY | Facility: CLINIC | Age: 74
End: 2024-06-27

## 2024-06-27 ENCOUNTER — OFFICE VISIT (OUTPATIENT)
Dept: CARDIOLOGY | Facility: CLINIC | Age: 74
End: 2024-06-27
Payer: COMMERCIAL

## 2024-06-27 VITALS
BODY MASS INDEX: 25.55 KG/M2 | OXYGEN SATURATION: 100 % | HEART RATE: 67 BPM | SYSTOLIC BLOOD PRESSURE: 124 MMHG | DIASTOLIC BLOOD PRESSURE: 76 MMHG | WEIGHT: 159 LBS | HEIGHT: 66 IN

## 2024-06-27 DIAGNOSIS — I25.10 ARTERIOSCLEROTIC CARDIOVASCULAR DISEASE (ASCVD): Primary | ICD-10-CM

## 2024-06-27 DIAGNOSIS — E78.5 DYSLIPIDEMIA: ICD-10-CM

## 2024-06-27 DIAGNOSIS — R60.0 PEDAL EDEMA: ICD-10-CM

## 2024-06-27 DIAGNOSIS — I10 ESSENTIAL HYPERTENSION: ICD-10-CM

## 2024-06-27 PROCEDURE — 99214 OFFICE O/P EST MOD 30 MIN: CPT | Performed by: PHYSICIAN ASSISTANT

## 2024-06-27 PROCEDURE — 93000 ELECTROCARDIOGRAM COMPLETE: CPT | Performed by: PHYSICIAN ASSISTANT

## 2024-06-27 NOTE — PROGRESS NOTES
Luci Wilson MD  Duy Schwarz  1950 06/27/2024    Patient Active Problem List   Diagnosis    Angina, class II-III    Diabetes    Arteriosclerotic cardiovascular disease (ASCVD), s/p CABG (3V) in 1991  with recent unsuccessful attempt at PCI of severe calcific stenosis of the proximal left circumflex coronary artery.    Dyslipidemia, pt's PMD monitoring.    Essential hypertension    Abnormal stress ECG    Abnormal stress test    Microscopic hematuria       Dear Luci Wilson MD:    Subjective     History of Present Illness:    Chief Complaint   Patient presents with    Follow-up     ROUTINE    Edema     Left ankle-denies injury       Duy Schwarz is a pleasant 74 y.o. male with a past medical history significant for SCVD, status post CABG in 1991 and subsequent in 2005, He also had left heart catheterization on 7/29/2022 with proximal LAD showing a 99% calcified lesion with a patent graft to the LAD that had a mid lesion .  He also had complete occlusion at the ostium of the left circumflex, patent grafts from the lima to the mid LAD and patent graft from to the RPDA as well. He also has diabetes mellitus and dyslipidemia. He comes in for routine cardiology follow up.      Duy comes in today for routine follow-up reporting that he has been having some slightly increased pedal edema in his left lower extremity he noticed this around February 2023 and has persisted since.  He denies any injury to this lower extremity in the past.  He also denies any worsening orthopnea, PND, or exertional dyspnea.  He reports just using 1 pillow at night and does not prop his bed up.  He is still at work regularly.    Allergies   Allergen Reactions    Valium [Diazepam] Other (See Comments)     Pt says this causes his heart to skip.   :      Current Outpatient Medications:     aspirin EC 81 MG EC tablet, Take 1 tablet by mouth Daily., Disp: 100 tablet, Rfl: 2    atenolol (TENORMIN) 50 MG tablet, Take 1.5  tablets by mouth Daily. 1.5 tablets daily. (Daily dose of 75 mg)., Disp: 135 tablet, Rfl: 2    atorvastatin (LIPITOR) 40 MG tablet, Take 1 tablet by mouth Daily., Disp: 90 tablet, Rfl: 3    Cholecalciferol 2000 UNITS capsule, Take 1 capsule by mouth Daily., Disp: , Rfl:     clopidogrel (PLAVIX) 75 MG tablet, Take 1 tablet by mouth Daily., Disp: 90 tablet, Rfl: 3    COENZYME Q10 PO, Take 20 mg by mouth Daily., Disp: , Rfl:     isosorbide mononitrate (IMDUR) 120 MG 24 hr tablet, Take 1 tablet by mouth Daily., Disp: 90 tablet, Rfl: 3    lisinopril (PRINIVIL,ZESTRIL) 10 MG tablet, Take 1 tablet by mouth Daily., Disp: 90 tablet, Rfl: 3    MEGARED OMEGA-3 KRILL  MG capsule, Take 1 capsule by mouth 2 (two) times a day., Disp: , Rfl:     nitroglycerin (NITROSTAT) 0.4 MG SL tablet, 1 under the tongue as needed for angina, may repeat q5mins for up three doses, Disp: 25 tablet, Rfl: 2    pantoprazole (PROTONIX) 20 MG EC tablet, Take 1 tablet by mouth Daily., Disp: , Rfl:     ranolazine (Ranexa) 1000 MG 12 hr tablet, Take 1 tablet by mouth 2 (Two) Times a Day., Disp: 180 tablet, Rfl: 5    sitaGLIPtin-metFORMIN (JANUMET)  MG per tablet, Take 1 tablet by mouth 2 (Two) Times a Day With Meals., Disp: , Rfl:     vitamin D3 125 MCG (5000 UT) capsule capsule, Take 1 capsule by mouth Daily., Disp: , Rfl:     repaglinide (PRANDIN) 1 MG tablet, Take 1 tablet by mouth 3 (Three) Times a Day Before Meals. (Patient not taking: Reported on 6/27/2024), Disp: , Rfl:     The following portions of the patient's history were reviewed and updated as appropriate: allergies, current medications, past family history, past medical history, past social history, past surgical history and problem list.    Social History     Tobacco Use    Smoking status: Never    Smokeless tobacco: Never   Vaping Use    Vaping status: Never Used   Substance Use Topics    Alcohol use: No    Drug use: No         Objective   Vitals:    06/27/24 1509   BP:  "124/76   Pulse: 67   SpO2: 100%   Weight: 72.1 kg (159 lb)   Height: 167.6 cm (66\")     Body mass index is 25.66 kg/m².    ROS    Constitutional:       General: Not in acute distress.     Appearance: Healthy appearance. Well-developed and not in distress. Not diaphoretic.   Eyes:      Conjunctiva/sclera: Conjunctivae normal.      Pupils: Pupils are equal, round, and reactive to light.   HENT:      Head: Normocephalic and atraumatic.   Neck:      Vascular: No carotid bruit or JVD.   Pulmonary:      Effort: Pulmonary effort is normal. No respiratory distress.      Breath sounds: Normal breath sounds.   Cardiovascular:      Normal rate. Regular rhythm.   Edema:     Peripheral edema absent.   Skin:     General: Skin is cool.   Neurological:      Mental Status: Alert, oriented to person, place, and time and oriented to person, place and time.         Lab Results   Component Value Date     08/30/2016    K 3.8 08/30/2016     08/30/2016    CO2 34.0 (H) 08/30/2016    BUN 11 08/30/2016    CREATININE 1.00 09/23/2019    GLUCOSE 91 08/30/2016    CALCIUM 8.8 08/30/2016     No results found for: \"CKTOTAL\"  Lab Results   Component Value Date    WBC 6.40 07/29/2022    HGB 13.4 (L) 07/29/2022    HCT 40.1 07/29/2022     07/29/2022     No results found for: \"INR\"  No results found for: \"MG\"  Lab Results   Component Value Date    PSA 0.416 06/15/2020    TRIG 111 08/29/2016    HDL 31 (L) 08/29/2016    LDL 50 08/29/2016      No results found for: \"BNP\"    During this visit the following were done:  Labs Reviewed []    Labs Ordered []    Radiology Reports Reviewed []    Radiology Ordered []    PCP Records Reviewed []    Referring Provider Records Reviewed []    ER Records Reviewed []    Hospital Records Reviewed []    History Obtained From Family []    Radiology Images Reviewed []    Other Reviewed []    Records Requested []         ECG 12 Lead    Date/Time: 6/27/2024 3:10 PM  Performed by: Jayy Almonte, " RD    Authorized by: Jayy Almonte PA-C  Comparison: compared with previous ECG   Similar to previous ECG  Rhythm: sinus rhythm  Conduction: conduction normal    Clinical impression: normal ECG        Assessment & Plan    Diagnosis Plan   1. Arteriosclerotic cardiovascular disease (ASCVD)  ECG 12 Lead    Adult Transthoracic Echo Complete W/ Cont if Necessary Per Protocol      2. Dyslipidemia, pt's PMD monitoring.  ECG 12 Lead      3. Essential hypertension        4. Pedal edema  US Venous Doppler Lower Extremity Left (duplex)               Recommendations:  ASCVD  Pulm does have chronic chest pains that are only brought on with moderate amounts of exertion he is able to form ADLs fairly well without recurrent angina.  He does have known diffuse coronary artery disease and has been treated medically.  Will continue with current GDMT  Continue aspirin, atenolol, Lipitor, Plavix, lisinopril, Imdur, Ranexa.  Left-sided lower extremity edema  This has been present for roughly 4 to 5 months leading to concern for DVT being lower.  However we will go ahead and try to rule this out with venous ultrasound of lower extremity.  He is concerned may be worsening heart failure/cardiomyopathy will order echocardiogram to reevaluate.    Return in about 6 weeks (around 8/8/2024).    As always, I appreciate very much the opportunity to participate in the cardiovascular care of your patients.      With Best Regards,    Jayy Almonte PA-C

## 2024-06-27 NOTE — Clinical Note
Can we call patient let him know that I do think we should do also do an ultrasound of his lower extremity to rule out a blood clot.

## 2024-06-27 NOTE — TELEPHONE ENCOUNTER
----- Message from Jayy Almonte sent at 6/27/2024  3:38 PM EDT -----  Can we call patient let him know that I do think we should do also do an ultrasound of his lower extremity to rule out a blood clot.

## 2024-07-17 ENCOUNTER — HOSPITAL ENCOUNTER (OUTPATIENT)
Facility: HOSPITAL | Age: 74
Discharge: HOME | End: 2024-07-17
Payer: COMMERCIAL

## 2024-07-17 DIAGNOSIS — R60.0 PEDAL EDEMA: ICD-10-CM

## 2024-07-17 DIAGNOSIS — I25.10 ARTERIOSCLEROTIC CARDIOVASCULAR DISEASE (ASCVD): ICD-10-CM

## 2024-07-17 LAB
BH CV ECHO MEAS - ACS: 1.17 CM
BH CV ECHO MEAS - AO MAX PG: 10 MMHG
BH CV ECHO MEAS - AO MEAN PG: 5.5 MMHG
BH CV ECHO MEAS - AO ROOT DIAM: 3.9 CM
BH CV ECHO MEAS - AO V2 MAX: 158.3 CM/SEC
BH CV ECHO MEAS - AO V2 VTI: 36.6 CM
BH CV ECHO MEAS - EDV(MOD-SP4): 85.5 ML
BH CV ECHO MEAS - EF(MOD-SP4): 66.5 %
BH CV ECHO MEAS - ESV(MOD-SP4): 28.6 ML
BH CV ECHO MEAS - IVS/LVPW: 0.96 CM
BH CV ECHO MEAS - IVSD: 0.99 CM
BH CV ECHO MEAS - LA DIMENSION: 3.7 CM
BH CV ECHO MEAS - LV DIASTOLIC VOL/BSA (35-75): 47.1 CM2
BH CV ECHO MEAS - LV MAX PG: 1.96 MMHG
BH CV ECHO MEAS - LV MEAN PG: 0.9 MMHG
BH CV ECHO MEAS - LV SYSTOLIC VOL/BSA (12-30): 15.7 CM2
BH CV ECHO MEAS - LV V1 MAX: 70 CM/SEC
BH CV ECHO MEAS - LV V1 VTI: 15 CM
BH CV ECHO MEAS - LVIDD: 5.3 CM
BH CV ECHO MEAS - LVIDS: 4 CM
BH CV ECHO MEAS - LVOT DIAM: 2.07 CM
BH CV ECHO MEAS - LVPWD: 1.03 CM
BH CV ECHO MEAS - PA ACC TIME: 0.05 SEC
BH CV ECHO MEAS - RAP SYSTOLE: 10 MMHG
BH CV ECHO MEAS - RVSP: 20 MMHG
BH CV ECHO MEAS - SV(MOD-SP4): 56.9 ML
BH CV ECHO MEAS - SVI(MOD-SP4): 31.3 ML/M2
BH CV ECHO MEAS - TAPSE (>1.6): 1.69 CM
BH CV ECHO MEAS - TR MAX PG: 10 MMHG
BH CV ECHO MEAS - TR MAX VEL: 156 CM/SEC

## 2024-07-17 PROCEDURE — 93971 EXTREMITY STUDY: CPT

## 2024-07-17 PROCEDURE — 93306 TTE W/DOPPLER COMPLETE: CPT

## 2024-08-15 ENCOUNTER — OFFICE VISIT (OUTPATIENT)
Dept: CARDIOLOGY | Facility: CLINIC | Age: 74
End: 2024-08-15
Payer: COMMERCIAL

## 2024-08-15 VITALS
HEART RATE: 71 BPM | BODY MASS INDEX: 25.88 KG/M2 | SYSTOLIC BLOOD PRESSURE: 157 MMHG | DIASTOLIC BLOOD PRESSURE: 92 MMHG | OXYGEN SATURATION: 96 % | HEIGHT: 66 IN | WEIGHT: 161 LBS

## 2024-08-15 DIAGNOSIS — I25.10 ARTERIOSCLEROTIC CARDIOVASCULAR DISEASE (ASCVD): Primary | ICD-10-CM

## 2024-08-15 DIAGNOSIS — I10 ESSENTIAL HYPERTENSION: ICD-10-CM

## 2024-08-15 DIAGNOSIS — E78.5 DYSLIPIDEMIA: ICD-10-CM

## 2024-08-15 PROCEDURE — 99214 OFFICE O/P EST MOD 30 MIN: CPT | Performed by: PHYSICIAN ASSISTANT

## 2024-08-15 NOTE — PROGRESS NOTES
Luci Wilson MD  Duy Schwarz  1950  08/15/2024    Patient Active Problem List   Diagnosis    Angina, class II-III    Diabetes    Arteriosclerotic cardiovascular disease (ASCVD), s/p CABG (3V) in 1991  with recent unsuccessful attempt at PCI of severe calcific stenosis of the proximal left circumflex coronary artery.    Dyslipidemia, pt's PMD monitoring.    Essential hypertension    Abnormal stress ECG    Abnormal stress test    Microscopic hematuria       Dear Luci Wilson MD:    Subjective     History of Present Illness:    Chief Complaint   Patient presents with    Follow-up     routine       Duy Schwarz is a pleasant 74 y.o. male with a past medical history significant for ASCVD, status post CABG in 1991 and subsequent in 2005, He also had left heart catheterization on 7/29/2022 with proximal LAD showing a 99% calcified lesion with a patent graft to the LAD that had a mid lesion .  He also had complete occlusion at the ostium of the left circumflex, patent grafts from the lima to the mid LAD and patent graft from to the RPDA as well. He also has diabetes mellitus and dyslipidemia. He comes in for routine cardiology follow up.      Duy comes in after echocardiogram was done that did show normal LVEF with mild aortic and mitral regurgitation and mild aortic stenosis but otherwise was stable. He denies any worsening exertional chest pains. He does report some occasional light headedness that occurs while he is at work and admits he often does not stay as well hydrated as he should. He denies any falls, syncope, or near syncope.     Allergies   Allergen Reactions    Valium [Diazepam] Other (See Comments)     Pt says this causes his heart to skip.   :      Current Outpatient Medications:     aspirin EC 81 MG EC tablet, Take 1 tablet by mouth Daily., Disp: 100 tablet, Rfl: 2    atenolol (TENORMIN) 50 MG tablet, Take 1.5 tablets by mouth Daily. 1.5 tablets daily. (Daily dose of 75 mg).,  Disp: 135 tablet, Rfl: 2    atorvastatin (LIPITOR) 40 MG tablet, Take 1 tablet by mouth Daily., Disp: 90 tablet, Rfl: 3    Cholecalciferol 2000 UNITS capsule, Take 1 capsule by mouth Daily., Disp: , Rfl:     clopidogrel (PLAVIX) 75 MG tablet, Take 1 tablet by mouth Daily., Disp: 90 tablet, Rfl: 3    COENZYME Q10 PO, Take 20 mg by mouth Daily., Disp: , Rfl:     isosorbide mononitrate (IMDUR) 120 MG 24 hr tablet, Take 1 tablet by mouth Daily., Disp: 90 tablet, Rfl: 3    lisinopril (PRINIVIL,ZESTRIL) 10 MG tablet, Take 1 tablet by mouth Daily., Disp: 90 tablet, Rfl: 3    MEGARED OMEGA-3 KRILL  MG capsule, Take 1 capsule by mouth 2 (two) times a day., Disp: , Rfl:     nitroglycerin (NITROSTAT) 0.4 MG SL tablet, 1 under the tongue as needed for angina, may repeat q5mins for up three doses, Disp: 25 tablet, Rfl: 2    pantoprazole (PROTONIX) 20 MG EC tablet, Take 1 tablet by mouth Daily., Disp: , Rfl:     ranolazine (Ranexa) 1000 MG 12 hr tablet, Take 1 tablet by mouth 2 (Two) Times a Day., Disp: 180 tablet, Rfl: 5    sitaGLIPtin-metFORMIN (JANUMET)  MG per tablet, Take 1 tablet by mouth 2 (Two) Times a Day With Meals., Disp: , Rfl:     vitamin D3 125 MCG (5000 UT) capsule capsule, Take 1 capsule by mouth Daily., Disp: , Rfl:     repaglinide (PRANDIN) 1 MG tablet, Take 1 tablet by mouth 3 (Three) Times a Day Before Meals. (Patient not taking: Reported on 6/27/2024), Disp: , Rfl:     The following portions of the patient's history were reviewed and updated as appropriate: allergies, current medications, past family history, past medical history, past social history, past surgical history and problem list.    Social History     Tobacco Use    Smoking status: Never    Smokeless tobacco: Never   Vaping Use    Vaping status: Never Used   Substance Use Topics    Alcohol use: No    Drug use: No         Objective   Vitals:    08/15/24 1512   BP: 157/92   Pulse: 71   SpO2: 96%   Weight: 73 kg (161 lb)   Height: 167.6  "cm (66\")     Body mass index is 25.99 kg/m².    ROS    Constitutional:       General: Not in acute distress.     Appearance: Healthy appearance. Well-developed and not in distress. Not diaphoretic.   Eyes:      Conjunctiva/sclera: Conjunctivae normal.      Pupils: Pupils are equal, round, and reactive to light.   HENT:      Head: Normocephalic and atraumatic.   Neck:      Vascular: No carotid bruit or JVD.   Pulmonary:      Effort: Pulmonary effort is normal. No respiratory distress.      Breath sounds: Normal breath sounds.   Cardiovascular:      Normal rate. Regular rhythm.   Edema:     Peripheral edema absent.   Skin:     General: Skin is cool.   Neurological:      Mental Status: Alert, oriented to person, place, and time and oriented to person, place and time.         Lab Results   Component Value Date     08/30/2016    K 3.8 08/30/2016     08/30/2016    CO2 34.0 (H) 08/30/2016    BUN 11 08/30/2016    CREATININE 1.00 09/23/2019    GLUCOSE 91 08/30/2016    CALCIUM 8.8 08/30/2016     No results found for: \"CKTOTAL\"  Lab Results   Component Value Date    WBC 6.40 07/29/2022    HGB 13.4 (L) 07/29/2022    HCT 40.1 07/29/2022     07/29/2022     No results found for: \"INR\"  No results found for: \"MG\"  Lab Results   Component Value Date    PSA 0.416 06/15/2020    TRIG 111 08/29/2016    HDL 31 (L) 08/29/2016    LDL 50 08/29/2016      No results found for: \"BNP\"    During this visit the following were done:  Labs Reviewed []    Labs Ordered []    Radiology Reports Reviewed []    Radiology Ordered []    PCP Records Reviewed []    Referring Provider Records Reviewed []    ER Records Reviewed []    Hospital Records Reviewed []    History Obtained From Family []    Radiology Images Reviewed []    Other Reviewed []    Records Requested []       Procedures    Assessment & Plan    Diagnosis Plan   1. Arteriosclerotic cardiovascular disease (ASCVD), s/p CABG (3V) in 1991  with recent unsuccessful attempt at PCI " of severe calcific stenosis of the proximal left circumflex coronary artery.        2. Essential hypertension        3. Dyslipidemia, pt's PMD monitoring.               Recommendations:  ASCVD  Stable, no worsening angina symptoms.   Continue aspirin, lipitor, atenolol, imdur, lisinopril, and ranexa.   Light headedness  Sounds suspicous for dehydration/hypotension. I asked him to lower dose of atenolol for a week to see if symptoms improve. I also asked him check BP when symptoms are present.   Pedal edema  Suspect he has dependent edema. Discussed results of echocardiogram which was unremarkable.     No follow-ups on file.    As always, I appreciate very much the opportunity to participate in the cardiovascular care of your patients.      With Best Regards,    Jayy Almonte PA-C

## 2024-08-23 ENCOUNTER — HOSPITAL ENCOUNTER (OUTPATIENT)
Facility: HOSPITAL | Age: 74
Discharge: HOME | End: 2024-08-23
Admitting: INTERNAL MEDICINE
Payer: COMMERCIAL

## 2024-08-23 ENCOUNTER — TRANSCRIBE ORDERS (OUTPATIENT)
Facility: HOSPITAL | Age: 74
End: 2024-08-23
Payer: COMMERCIAL

## 2024-08-23 DIAGNOSIS — E22.2 SIADH (SYNDROME OF INAPPROPRIATE ADH PRODUCTION): ICD-10-CM

## 2024-08-23 DIAGNOSIS — E22.2 SIADH (SYNDROME OF INAPPROPRIATE ADH PRODUCTION): Primary | ICD-10-CM

## 2024-08-23 PROCEDURE — 71046 X-RAY EXAM CHEST 2 VIEWS: CPT

## 2024-11-12 ENCOUNTER — OFFICE VISIT (OUTPATIENT)
Dept: CARDIOLOGY | Facility: CLINIC | Age: 74
End: 2024-11-12
Payer: COMMERCIAL

## 2024-11-12 ENCOUNTER — TELEPHONE (OUTPATIENT)
Age: 74
End: 2024-11-12
Payer: COMMERCIAL

## 2024-11-12 VITALS
HEIGHT: 66 IN | WEIGHT: 159 LBS | HEART RATE: 63 BPM | OXYGEN SATURATION: 100 % | DIASTOLIC BLOOD PRESSURE: 73 MMHG | SYSTOLIC BLOOD PRESSURE: 122 MMHG | BODY MASS INDEX: 25.55 KG/M2

## 2024-11-12 DIAGNOSIS — I10 ESSENTIAL HYPERTENSION: ICD-10-CM

## 2024-11-12 DIAGNOSIS — E78.5 DYSLIPIDEMIA: ICD-10-CM

## 2024-11-12 DIAGNOSIS — I25.10 ARTERIOSCLEROTIC CARDIOVASCULAR DISEASE (ASCVD): Primary | ICD-10-CM

## 2024-11-12 PROCEDURE — 99214 OFFICE O/P EST MOD 30 MIN: CPT | Performed by: PHYSICIAN ASSISTANT

## 2024-11-12 NOTE — PROGRESS NOTES
Luci Wilson MD  Duy Schwarz  1950 11/12/2024    Patient Active Problem List   Diagnosis    Angina, class II-III    Diabetes    Arteriosclerotic cardiovascular disease (ASCVD), s/p CABG (3V) in 1991  with recent unsuccessful attempt at PCI of severe calcific stenosis of the proximal left circumflex coronary artery.    Dyslipidemia, pt's PMD monitoring.    Essential hypertension    Microscopic hematuria       Dear Luci Wilson MD:    Subjective     History of Present Illness:    Chief Complaint   Patient presents with    Follow-up       Duy Schwarz is a pleasant 74 y.o. male with a past medical history significant for ASCVD, status post CABG in 1991 and subsequent in 2005, He also had left heart catheterization on 7/29/2022 with proximal LAD showing a 99% calcified lesion with a patent graft to the LAD that had a mid lesion .  He also had complete occlusion at the ostium of the left circumflex, patent grafts from the lima to the mid LAD and patent graft from to the RPDA as well. He also has diabetes mellitus and dyslipidemia. He comes in for routine cardiology follow up.      Duy still reports some symptoms somewhat consistent with vertigo as he reports some lightheadedness/haziness when he has quick movements or looks in a certain direction quickly he reports that the symptoms can be very sporadic going weeks without an episode but other times having an episode every day.  He reports they typically last fairly short but have been associated with nausea.  In regards to his typical anginal symptoms he denies any recent worsening he reports if he overexerts he can get some precordial pain which have been stable.  He denies any worsening dyspnea he is able to work without any major limitations    Allergies   Allergen Reactions    Valium [Diazepam] Other (See Comments)     Pt says this causes his heart to skip.   :      Current Outpatient Medications:     aspirin EC 81 MG EC tablet, Take 1  tablet by mouth Daily., Disp: 100 tablet, Rfl: 2    atenolol (TENORMIN) 50 MG tablet, Take 1.5 tablets by mouth Daily. 1.5 tablets daily. (Daily dose of 75 mg)., Disp: 135 tablet, Rfl: 2    atorvastatin (LIPITOR) 40 MG tablet, Take 1 tablet by mouth Daily., Disp: 90 tablet, Rfl: 3    Cholecalciferol 2000 UNITS capsule, Take 1 capsule by mouth Daily., Disp: , Rfl:     clopidogrel (PLAVIX) 75 MG tablet, Take 1 tablet by mouth Daily., Disp: 90 tablet, Rfl: 3    COENZYME Q10 PO, Take 20 mg by mouth Daily., Disp: , Rfl:     isosorbide mononitrate (IMDUR) 120 MG 24 hr tablet, Take 1 tablet by mouth Daily., Disp: 90 tablet, Rfl: 3    lisinopril (PRINIVIL,ZESTRIL) 10 MG tablet, Take 1 tablet by mouth Daily., Disp: 90 tablet, Rfl: 3    MEGARED OMEGA-3 KRILL  MG capsule, Take 1 capsule by mouth 2 (two) times a day., Disp: , Rfl:     nitroglycerin (NITROSTAT) 0.4 MG SL tablet, 1 under the tongue as needed for angina, may repeat q5mins for up three doses, Disp: 25 tablet, Rfl: 2    pantoprazole (PROTONIX) 20 MG EC tablet, Take 1 tablet by mouth Daily., Disp: , Rfl:     ranolazine (Ranexa) 1000 MG 12 hr tablet, Take 1 tablet by mouth 2 (Two) Times a Day., Disp: 180 tablet, Rfl: 5    repaglinide (PRANDIN) 1 MG tablet, Take 1 tablet by mouth 3 (Three) Times a Day Before Meals., Disp: , Rfl:     sitaGLIPtin-metFORMIN (JANUMET)  MG per tablet, Take 1 tablet by mouth 2 (Two) Times a Day With Meals., Disp: , Rfl:     vitamin D3 125 MCG (5000 UT) capsule capsule, Take 1 capsule by mouth Daily., Disp: , Rfl:     The following portions of the patient's history were reviewed and updated as appropriate: allergies, current medications, past family history, past medical history, past social history, past surgical history and problem list.    Social History     Tobacco Use    Smoking status: Never    Smokeless tobacco: Never   Vaping Use    Vaping status: Never Used   Substance Use Topics    Alcohol use: No    Drug use: No  "        Objective   Vitals:    11/12/24 1510   BP: 122/73   BP Location: Left arm   Patient Position: Sitting   Cuff Size: Adult   Pulse: 63   SpO2: 100%   Weight: 72.1 kg (159 lb)   Height: 167.6 cm (65.98\")     Body mass index is 25.68 kg/m².    ROS    Constitutional:       General: Not in acute distress.     Appearance: Healthy appearance. Well-developed and not in distress. Not diaphoretic.   Eyes:      Conjunctiva/sclera: Conjunctivae normal.      Pupils: Pupils are equal, round, and reactive to light.   HENT:      Head: Normocephalic and atraumatic.   Neck:      Vascular: No carotid bruit or JVD.   Pulmonary:      Effort: Pulmonary effort is normal. No respiratory distress.      Breath sounds: Normal breath sounds.   Cardiovascular:      Normal rate. Regular rhythm.   Edema:     Peripheral edema absent.   Skin:     General: Skin is cool.   Neurological:      Mental Status: Alert, oriented to person, place, and time and oriented to person, place and time.         Lab Results   Component Value Date     08/30/2016    K 3.8 08/30/2016     08/30/2016    CO2 34.0 (H) 08/30/2016    BUN 11 08/30/2016    CREATININE 1.00 09/23/2019    GLUCOSE 91 08/30/2016    CALCIUM 8.8 08/30/2016     No results found for: \"CKTOTAL\"  Lab Results   Component Value Date    WBC 6.40 07/29/2022    HGB 13.4 (L) 07/29/2022    HCT 40.1 07/29/2022     07/29/2022     No results found for: \"INR\"  No results found for: \"MG\"  Lab Results   Component Value Date    PSA 0.416 06/15/2020    TRIG 111 08/29/2016    HDL 31 (L) 08/29/2016    LDL 50 08/29/2016      No results found for: \"BNP\"    During this visit the following were done:  Labs Reviewed []    Labs Ordered []    Radiology Reports Reviewed []    Radiology Ordered []    PCP Records Reviewed []    Referring Provider Records Reviewed []    ER Records Reviewed []    Hospital Records Reviewed []    History Obtained From Family []    Radiology Images Reviewed []    Other Reviewed " []    Records Requested []       Procedures    Assessment & Plan    Diagnosis Plan   1. Arteriosclerotic cardiovascular disease (ASCVD), s/p CABG (3V) in 1991  with recent unsuccessful attempt at PCI of severe calcific stenosis of the proximal left circumflex coronary artery.        2. Dyslipidemia, pt's PMD monitoring.        3. Essential hypertension                 Recommendations:  ASCVD with angina class II  Overall stable and this has been chronic patient currently happy with current treatment plan will continue with aspirin, atenolol, Lipitor, Plavix, lisinopril, Imdur, Ranexa.  Dyslipidemia  Will continue with Lipitor requested labs from PCP  Dizziness  Symptoms are somewhat vague seem more consistent with vertigo as they come on with quick head movements or looking in particular directions.  Encouraged him to speak with PCP discussed possible carotid ultrasound however will hold off for now.  Essential hypertension  Excellent control    Return in about 6 months (around 5/12/2025).    As always, I appreciate very much the opportunity to participate in the cardiovascular care of your patients.      With Best Regards,    Jayy Almonte PA-C

## 2024-11-12 NOTE — TELEPHONE ENCOUNTER
Sent fax request.     ----- Message from Jayy Almonte sent at 11/12/2024  3:21 PM EST -----  Can we get labs from pcp

## 2025-05-09 ENCOUNTER — TELEPHONE (OUTPATIENT)
Dept: CARDIOLOGY | Facility: CLINIC | Age: 75
End: 2025-05-09
Payer: COMMERCIAL

## 2025-05-12 ENCOUNTER — OFFICE VISIT (OUTPATIENT)
Dept: CARDIOLOGY | Facility: CLINIC | Age: 75
End: 2025-05-12
Payer: MEDICARE

## 2025-05-12 VITALS
DIASTOLIC BLOOD PRESSURE: 64 MMHG | BODY MASS INDEX: 25.17 KG/M2 | WEIGHT: 156.6 LBS | OXYGEN SATURATION: 99 % | HEART RATE: 65 BPM | SYSTOLIC BLOOD PRESSURE: 118 MMHG | HEIGHT: 66 IN

## 2025-05-12 DIAGNOSIS — I25.10 ARTERIOSCLEROTIC CARDIOVASCULAR DISEASE (ASCVD): Primary | ICD-10-CM

## 2025-05-12 DIAGNOSIS — I10 ESSENTIAL HYPERTENSION: ICD-10-CM

## 2025-05-12 DIAGNOSIS — E78.5 DYSLIPIDEMIA: ICD-10-CM

## 2025-05-12 PROCEDURE — 99214 OFFICE O/P EST MOD 30 MIN: CPT | Performed by: PHYSICIAN ASSISTANT

## 2025-05-12 PROCEDURE — 3074F SYST BP LT 130 MM HG: CPT | Performed by: PHYSICIAN ASSISTANT

## 2025-05-12 PROCEDURE — 93000 ELECTROCARDIOGRAM COMPLETE: CPT | Performed by: PHYSICIAN ASSISTANT

## 2025-05-12 PROCEDURE — 3078F DIAST BP <80 MM HG: CPT | Performed by: PHYSICIAN ASSISTANT

## 2025-05-12 NOTE — PROGRESS NOTES
Luci Wilson MD  Duy Schwarz  1950 05/12/2025    Patient Active Problem List   Diagnosis    Angina, class II-III    Diabetes    Arteriosclerotic cardiovascular disease (ASCVD), s/p CABG (3V) in 1991  with recent unsuccessful attempt at PCI of severe calcific stenosis of the proximal left circumflex coronary artery.    Dyslipidemia, pt's PMD monitoring.    Essential hypertension    Microscopic hematuria       Dear Luci Wilson MD:    Subjective     History of Present Illness:    Chief Complaint   Patient presents with    Follow-up     routine       Duy Schwarz is a pleasant 75 y.o. male with a past medical history significant for ASCVD, status post CABG in 1991 and subsequent in 2005, He also had left heart catheterization on 7/29/2022 with proximal LAD showing a 99% calcified lesion with a patent graft to the LAD that had a mid lesion .  He also had complete occlusion at the ostium of the left circumflex, patent grafts from the lima to the mid LAD and patent graft from to the RPDA as well. He also has diabetes mellitus and dyslipidemia. He comes in for routine cardiology follow up.      Duy comes in today reporting he has been doing well.  Upon further questioning he denies any chest pains, shortness of breath, or syncope.  At last visit he was reporting some dizziness with quick head movements when I did bring this up he did report that he still having this on occasion but he believes it is much better and hardly noticeable at times.  Again he has not had any falls, syncope, or near syncope.  I reviewed recent labs from PCP and LDL is excellently controlled at 46      Allergies   Allergen Reactions    Valium [Diazepam] Other (See Comments)     Pt says this causes his heart to skip.   :      Current Outpatient Medications:     aspirin EC 81 MG EC tablet, Take 1 tablet by mouth Daily., Disp: 100 tablet, Rfl: 2    atenolol (TENORMIN) 50 MG tablet, Take 1.5 tablets by mouth Daily. 1.5  tablets daily. (Daily dose of 75 mg)., Disp: 135 tablet, Rfl: 2    atorvastatin (LIPITOR) 40 MG tablet, Take 1 tablet by mouth Daily., Disp: 90 tablet, Rfl: 3    Cholecalciferol 2000 UNITS capsule, Take 1 capsule by mouth Daily., Disp: , Rfl:     clopidogrel (PLAVIX) 75 MG tablet, Take 1 tablet by mouth Daily., Disp: 90 tablet, Rfl: 3    COENZYME Q10 PO, Take 20 mg by mouth Daily., Disp: , Rfl:     isosorbide mononitrate (IMDUR) 120 MG 24 hr tablet, Take 1 tablet by mouth Daily., Disp: 90 tablet, Rfl: 3    lisinopril (PRINIVIL,ZESTRIL) 10 MG tablet, Take 1 tablet by mouth Daily., Disp: 90 tablet, Rfl: 3    MEGARED OMEGA-3 KRILL  MG capsule, Take 1 capsule by mouth 2 (two) times a day., Disp: , Rfl:     nitroglycerin (NITROSTAT) 0.4 MG SL tablet, 1 under the tongue as needed for angina, may repeat q5mins for up three doses, Disp: 25 tablet, Rfl: 2    pantoprazole (PROTONIX) 20 MG EC tablet, Take 1 tablet by mouth Daily., Disp: , Rfl:     ranolazine (Ranexa) 1000 MG 12 hr tablet, Take 1 tablet by mouth 2 (Two) Times a Day., Disp: 180 tablet, Rfl: 5    repaglinide (PRANDIN) 1 MG tablet, Take 1 tablet by mouth 3 (Three) Times a Day Before Meals., Disp: , Rfl:     sitaGLIPtin-metFORMIN (JANUMET)  MG per tablet, Take 1 tablet by mouth 2 (Two) Times a Day With Meals., Disp: , Rfl:     vitamin D3 125 MCG (5000 UT) capsule capsule, Take 1 capsule by mouth Daily., Disp: , Rfl:     The following portions of the patient's history were reviewed and updated as appropriate: allergies, current medications, past family history, past medical history, past social history, past surgical history and problem list.    Social History     Tobacco Use    Smoking status: Never    Smokeless tobacco: Never   Vaping Use    Vaping status: Never Used   Substance Use Topics    Alcohol use: No    Drug use: No         Objective   Vitals:    05/12/25 1458   BP: 118/64   BP Location: Left arm   Patient Position: Sitting   Cuff Size: Adult  "  Pulse: 65   SpO2: 99%   Weight: 71 kg (156 lb 9.6 oz)   Height: 167.6 cm (65.98\")     Body mass index is 25.29 kg/m².    ROS    Constitutional:       General: Not in acute distress.     Appearance: Healthy appearance. Well-developed and not in distress. Not diaphoretic.   Eyes:      Conjunctiva/sclera: Conjunctivae normal.      Pupils: Pupils are equal, round, and reactive to light.   HENT:      Head: Normocephalic and atraumatic.   Neck:      Vascular: No carotid bruit or JVD.   Pulmonary:      Effort: Pulmonary effort is normal. No respiratory distress.      Breath sounds: Normal breath sounds.   Cardiovascular:      Normal rate. Regular rhythm.   Edema:     Peripheral edema absent.   Skin:     General: Skin is cool.   Neurological:      Mental Status: Alert, oriented to person, place, and time and oriented to person, place and time.         Lab Results   Component Value Date     08/30/2016    K 3.8 08/30/2016     08/30/2016    CO2 34.0 (H) 08/30/2016    BUN 11 08/30/2016    CREATININE 1.00 09/23/2019    GLUCOSE 91 08/30/2016    CALCIUM 8.8 08/30/2016     No results found for: \"CKTOTAL\"  Lab Results   Component Value Date    WBC 6.40 07/29/2022    HGB 13.4 (L) 07/29/2022    HCT 40.1 07/29/2022     07/29/2022     No results found for: \"INR\"  No results found for: \"MG\"  Lab Results   Component Value Date    PSA 0.416 06/15/2020    TRIG 111 08/29/2016    HDL 31 (L) 08/29/2016    LDL 50 08/29/2016      No results found for: \"BNP\"    During this visit the following were done:  Labs Reviewed []    Labs Ordered []    Radiology Reports Reviewed []    Radiology Ordered []    PCP Records Reviewed []    Referring Provider Records Reviewed []    ER Records Reviewed []    Hospital Records Reviewed []    History Obtained From Family []    Radiology Images Reviewed []    Other Reviewed []    Records Requested []         ECG 12 Lead    Date/Time: 5/12/2025 3:09 PM  Performed by: Jayy Almonte " RD    Authorized by: Jayy Almonte PA-C  Comparison: compared with previous ECG   Similar to previous ECG  Rhythm: sinus rhythm  Conduction: 1st degree AV block  ST Segments: ST segments normal    Clinical impression: normal ECG          Assessment & Plan    Diagnosis Plan   1. Arteriosclerotic cardiovascular disease (ASCVD), s/p CABG (3V) in 1991  with recent unsuccessful attempt at PCI of severe calcific stenosis of the proximal left circumflex coronary artery.  ECG 12 Lead      2. Essential hypertension        3. Dyslipidemia, pt's PMD monitoring.                 Recommendations:  ASCVD  Doing well clinically no anginal symptoms continue GDMT with aspirin, atenolol, Lipitor, Plavix, lisinopril, Ranexa, Imdur  Essential hypertension  Excellently controlled  Dyslipidemia  LDL goal of less than 55 currently at goal at 46  Dizziness  Does admit that this is improved from what he was reporting at last visit denies any falls, syncope, or near syncope.  Reports it only occurs with quick head movements encouraged him to speak with PCP but currently is doing well and does not wish to pursue any further workup from my standpoint    No follow-ups on file.    As always, I appreciate very much the opportunity to participate in the cardiovascular care of your patients.      With Best Regards,    Jayy Almonte PA-C